# Patient Record
Sex: MALE | Race: OTHER | HISPANIC OR LATINO | URBAN - METROPOLITAN AREA
[De-identification: names, ages, dates, MRNs, and addresses within clinical notes are randomized per-mention and may not be internally consistent; named-entity substitution may affect disease eponyms.]

---

## 2017-07-06 ENCOUNTER — INPATIENT (INPATIENT)
Facility: HOSPITAL | Age: 57
LOS: 3 days | Discharge: HOME | DRG: 271 | End: 2017-07-10
Attending: INTERNAL MEDICINE | Admitting: INTERNAL MEDICINE
Payer: MEDICAID

## 2017-07-06 VITALS — OXYGEN SATURATION: 97 % | HEART RATE: 88 BPM | TEMPERATURE: 98 F | WEIGHT: 152.56 LBS | HEIGHT: 66 IN

## 2017-07-06 DIAGNOSIS — Z98.890 OTHER SPECIFIED POSTPROCEDURAL STATES: Chronic | ICD-10-CM

## 2017-07-06 DIAGNOSIS — I21.02 ST ELEVATION (STEMI) MYOCARDIAL INFARCTION INVOLVING LEFT ANTERIOR DESCENDING CORONARY ARTERY: ICD-10-CM

## 2017-07-06 DIAGNOSIS — E78.5 HYPERLIPIDEMIA, UNSPECIFIED: ICD-10-CM

## 2017-07-06 DIAGNOSIS — I21.3 ST ELEVATION (STEMI) MYOCARDIAL INFARCTION OF UNSPECIFIED SITE: ICD-10-CM

## 2017-07-06 DIAGNOSIS — I95.9 HYPOTENSION, UNSPECIFIED: ICD-10-CM

## 2017-07-06 DIAGNOSIS — I10 ESSENTIAL (PRIMARY) HYPERTENSION: ICD-10-CM

## 2017-07-06 DIAGNOSIS — I44.2 ATRIOVENTRICULAR BLOCK, COMPLETE: ICD-10-CM

## 2017-07-06 LAB
ALBUMIN SERPL ELPH-MCNC: 3.7 G/DL — SIGNIFICANT CHANGE UP (ref 3.3–5)
ALBUMIN SERPL ELPH-MCNC: 3.9 G/DL — SIGNIFICANT CHANGE UP (ref 3.3–5)
ALP SERPL-CCNC: 58 U/L — SIGNIFICANT CHANGE UP (ref 40–120)
ALP SERPL-CCNC: 60 U/L — SIGNIFICANT CHANGE UP (ref 40–120)
ALT FLD-CCNC: 23 U/L RC — SIGNIFICANT CHANGE UP (ref 10–45)
ALT FLD-CCNC: 38 U/L RC — SIGNIFICANT CHANGE UP (ref 10–45)
ANION GAP SERPL CALC-SCNC: 16 MMOL/L — SIGNIFICANT CHANGE UP (ref 5–17)
ANION GAP SERPL CALC-SCNC: 19 MMOL/L — HIGH (ref 5–17)
AST SERPL-CCNC: 151 U/L — HIGH (ref 10–40)
AST SERPL-CCNC: 29 U/L — SIGNIFICANT CHANGE UP (ref 10–40)
BASOPHILS # BLD AUTO: 0 K/UL — SIGNIFICANT CHANGE UP (ref 0–0.2)
BASOPHILS NFR BLD AUTO: 0.2 % — SIGNIFICANT CHANGE UP (ref 0–2)
BILIRUB SERPL-MCNC: 0.4 MG/DL — SIGNIFICANT CHANGE UP (ref 0.2–1.2)
BILIRUB SERPL-MCNC: 0.5 MG/DL — SIGNIFICANT CHANGE UP (ref 0.2–1.2)
BLD GP AB SCN SERPL QL: NEGATIVE — SIGNIFICANT CHANGE UP
BUN SERPL-MCNC: 16 MG/DL — SIGNIFICANT CHANGE UP (ref 7–23)
BUN SERPL-MCNC: 17 MG/DL — SIGNIFICANT CHANGE UP (ref 7–23)
CALCIUM SERPL-MCNC: 9.2 MG/DL — SIGNIFICANT CHANGE UP (ref 8.4–10.5)
CALCIUM SERPL-MCNC: 9.7 MG/DL — SIGNIFICANT CHANGE UP (ref 8.4–10.5)
CHLORIDE SERPL-SCNC: 100 MMOL/L — SIGNIFICANT CHANGE UP (ref 96–108)
CHLORIDE SERPL-SCNC: 103 MMOL/L — SIGNIFICANT CHANGE UP (ref 96–108)
CK MB CFR SERPL CALC: 300 NG/ML — HIGH (ref 0–6.7)
CK SERPL-CCNC: 2380 U/L — HIGH (ref 30–200)
CO2 SERPL-SCNC: 20 MMOL/L — LOW (ref 22–31)
CO2 SERPL-SCNC: 21 MMOL/L — LOW (ref 22–31)
CREAT SERPL-MCNC: 0.86 MG/DL — SIGNIFICANT CHANGE UP (ref 0.5–1.3)
CREAT SERPL-MCNC: 0.96 MG/DL — SIGNIFICANT CHANGE UP (ref 0.5–1.3)
EOSINOPHIL # BLD AUTO: 0 K/UL — SIGNIFICANT CHANGE UP (ref 0–0.5)
EOSINOPHIL NFR BLD AUTO: 0.1 % — SIGNIFICANT CHANGE UP (ref 0–6)
GLUCOSE SERPL-MCNC: 130 MG/DL — HIGH (ref 70–99)
GLUCOSE SERPL-MCNC: 159 MG/DL — HIGH (ref 70–99)
HCT VFR BLD CALC: 42.3 % — SIGNIFICANT CHANGE UP (ref 39–50)
HCT VFR BLD CALC: 42.7 % — SIGNIFICANT CHANGE UP (ref 39–50)
HGB BLD-MCNC: 14.4 G/DL — SIGNIFICANT CHANGE UP (ref 13–17)
HGB BLD-MCNC: 14.6 G/DL — SIGNIFICANT CHANGE UP (ref 13–17)
INR BLD: 1.05 RATIO — SIGNIFICANT CHANGE UP (ref 0.88–1.16)
LYMPHOCYTES # BLD AUTO: 0.7 K/UL — LOW (ref 1–3.3)
LYMPHOCYTES # BLD AUTO: 4.6 % — LOW (ref 13–44)
MAGNESIUM SERPL-MCNC: 2.2 MG/DL — SIGNIFICANT CHANGE UP (ref 1.6–2.6)
MCHC RBC-ENTMCNC: 30.3 PG — SIGNIFICANT CHANGE UP (ref 27–34)
MCHC RBC-ENTMCNC: 31.1 PG — SIGNIFICANT CHANGE UP (ref 27–34)
MCHC RBC-ENTMCNC: 33.8 GM/DL — SIGNIFICANT CHANGE UP (ref 32–36)
MCHC RBC-ENTMCNC: 34.6 GM/DL — SIGNIFICANT CHANGE UP (ref 32–36)
MCV RBC AUTO: 89.7 FL — SIGNIFICANT CHANGE UP (ref 80–100)
MCV RBC AUTO: 89.9 FL — SIGNIFICANT CHANGE UP (ref 80–100)
MONOCYTES # BLD AUTO: 0.6 K/UL — SIGNIFICANT CHANGE UP (ref 0–0.9)
MONOCYTES NFR BLD AUTO: 3.7 % — SIGNIFICANT CHANGE UP (ref 2–14)
NEUTROPHILS # BLD AUTO: 14 K/UL — HIGH (ref 1.8–7.4)
NEUTROPHILS NFR BLD AUTO: 91.4 % — HIGH (ref 43–77)
PHOSPHATE SERPL-MCNC: 4.2 MG/DL — SIGNIFICANT CHANGE UP (ref 2.5–4.5)
PLATELET # BLD AUTO: 242 K/UL — SIGNIFICANT CHANGE UP (ref 150–400)
PLATELET # BLD AUTO: 267 K/UL — SIGNIFICANT CHANGE UP (ref 150–400)
POTASSIUM SERPL-MCNC: 3.9 MMOL/L — SIGNIFICANT CHANGE UP (ref 3.5–5.3)
POTASSIUM SERPL-MCNC: 4.1 MMOL/L — SIGNIFICANT CHANGE UP (ref 3.5–5.3)
POTASSIUM SERPL-SCNC: 3.9 MMOL/L — SIGNIFICANT CHANGE UP (ref 3.5–5.3)
POTASSIUM SERPL-SCNC: 4.1 MMOL/L — SIGNIFICANT CHANGE UP (ref 3.5–5.3)
PROT SERPL-MCNC: 6.8 G/DL — SIGNIFICANT CHANGE UP (ref 6–8.3)
PROT SERPL-MCNC: 7.4 G/DL — SIGNIFICANT CHANGE UP (ref 6–8.3)
PROTHROM AB SERPL-ACNC: 11.3 SEC — SIGNIFICANT CHANGE UP (ref 9.8–12.7)
RBC # BLD: 4.7 M/UL — SIGNIFICANT CHANGE UP (ref 4.2–5.8)
RBC # BLD: 4.76 M/UL — SIGNIFICANT CHANGE UP (ref 4.2–5.8)
RBC # FLD: 11.3 % — SIGNIFICANT CHANGE UP (ref 10.3–14.5)
RBC # FLD: 11.4 % — SIGNIFICANT CHANGE UP (ref 10.3–14.5)
RH IG SCN BLD-IMP: POSITIVE — SIGNIFICANT CHANGE UP
SODIUM SERPL-SCNC: 139 MMOL/L — SIGNIFICANT CHANGE UP (ref 135–145)
SODIUM SERPL-SCNC: 140 MMOL/L — SIGNIFICANT CHANGE UP (ref 135–145)
TROPONIN T SERPL-MCNC: 4.51 NG/ML — HIGH (ref 0–0.06)
WBC # BLD: 15.3 K/UL — HIGH (ref 3.8–10.5)
WBC # BLD: 17.9 K/UL — HIGH (ref 3.8–10.5)
WBC # FLD AUTO: 15.3 K/UL — HIGH (ref 3.8–10.5)
WBC # FLD AUTO: 17.9 K/UL — HIGH (ref 3.8–10.5)

## 2017-07-06 PROCEDURE — 71010: CPT | Mod: 26

## 2017-07-06 PROCEDURE — 93010 ELECTROCARDIOGRAM REPORT: CPT

## 2017-07-06 PROCEDURE — 92941 PRQ TRLML REVSC TOT OCCL AMI: CPT | Mod: LD,GC

## 2017-07-06 PROCEDURE — 93458 L HRT ARTERY/VENTRICLE ANGIO: CPT | Mod: 26,59,GC

## 2017-07-06 PROCEDURE — 33967 INSERT I-AORT PERCUT DEVICE: CPT

## 2017-07-06 RX ORDER — ATORVASTATIN CALCIUM 80 MG/1
80 TABLET, FILM COATED ORAL AT BEDTIME
Qty: 0 | Refills: 0 | Status: DISCONTINUED | OUTPATIENT
Start: 2017-07-06 | End: 2017-07-10

## 2017-07-06 RX ORDER — HEPARIN SODIUM 5000 [USP'U]/ML
INJECTION INTRAVENOUS; SUBCUTANEOUS
Qty: 25000 | Refills: 0 | Status: DISCONTINUED | OUTPATIENT
Start: 2017-07-06 | End: 2017-07-08

## 2017-07-06 RX ORDER — CLOPIDOGREL BISULFATE 75 MG/1
75 TABLET, FILM COATED ORAL DAILY
Qty: 0 | Refills: 0 | Status: DISCONTINUED | OUTPATIENT
Start: 2017-07-07 | End: 2017-07-10

## 2017-07-06 RX ORDER — ASPIRIN/CALCIUM CARB/MAGNESIUM 324 MG
81 TABLET ORAL DAILY
Qty: 0 | Refills: 0 | Status: DISCONTINUED | OUTPATIENT
Start: 2017-07-07 | End: 2017-07-10

## 2017-07-06 RX ORDER — HEPARIN SODIUM 5000 [USP'U]/ML
4000 INJECTION INTRAVENOUS; SUBCUTANEOUS EVERY 6 HOURS
Qty: 0 | Refills: 0 | Status: DISCONTINUED | OUTPATIENT
Start: 2017-07-06 | End: 2017-07-08

## 2017-07-06 NOTE — H&P ADULT - PROBLEM SELECTOR PLAN 5
Not currently taking home medications and patient currently is hypotensive, augmenting to 90s with IABP.

## 2017-07-06 NOTE — H&P ADULT - ATTENDING COMMENTS
Patient presented with anterior wall STEMI, now status post PCI with PEARL to LAD with placement of IABP and subsequent TVP for complete heart block seen in cath lab. Now normal sinus rhythm.   - educated on the importance of DAPT   - Ace-inhibitor to initiated  - Beta-blocker initiated. Will monitor prior to discontinuing TVP  - patient is on Lipitor 80 mg daily  - TTE prior to discharge

## 2017-07-06 NOTE — H&P ADULT - PROBLEM SELECTOR PLAN 3
Resolved, however patient continues to have beats of AIVR. TVP in proper position on CXR.   - Continue to monitor on telemetry  - D/c TVP when appropriate

## 2017-07-06 NOTE — H&P ADULT - HISTORY OF PRESENT ILLNESS
57 yo M   p/w STEMI    Patient presented to [facility]. He was noted to have ST segment elevations in I/aVL, V2-V5, with reciprocal depressions in II/III/aVF. 55 yo Serbian speaking M pmhx -- who presents with STEMI.         Patient presented to [facility]. He was noted to have ST segment elevations in I/aVL, V2-V5, with reciprocal depressions in II/III/aVF. 57 yo Citizen of [place] speaking M pmhx HTN, HLD who presents with STEMI.     Patient was taking train home from work when he noticed chest pain with numbness down left arm to his hand. He never had this pain before, and described the pain as "everything." It was associated with sweating, nausea and vomiting. He described the sensation of feeling intoxicated, but denied any recent drinking. This pain started at 6 pm.         Patient presented to [facility]. He was noted to have ST segment elevations in I/aVL, V2-V5, with reciprocal depressions in II/III/aVF.     Received 325 mg ASA and 600 plavix, bolused heparin/gtt. Nitroglycerin and metoprolol    Went to cath lab  - POBA/PEARL x1 prox LAD (100%)  - IABP  - TVP  mid Cx 50%  RCA - no flow limiting stenosis      LVEDP 31 55 yo Bulgarian speaking M pmhx HTN, HLD who presents with anterolateral STEMI.     Patient was taking train home from work when he noticed chest pain with numbness down left arm to his hand. He never had this pain before, and described the pain as "everything." It was associated with sweating, nausea and vomiting. He described the sensation of feeling intoxicated, but denied any recent drinking. This pain started at 6 pm and gradually became worse. 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 He also felt like passing out, but denied loss of consciousness.     He initially went to [facility], where he was noted to have ST segment elevations in I/aVL, V2-V5, with reciprocal depressions in II/III/aVF. He was loaded with 325 mg ASA and 600 plavix, bolused heparin and started on heparin gtt. He was also given nitroglycerin and metoprolol.     He emergently was transferred to [facility] for cath. He had a POBA/PEARL x1 placed to proximal LAD (100% stenosed). His mid Cx was 50% stenosed and he had no flow limiting lesions in his RCA. His LVEDP was 31. He was given neosynephrine and had an IABP placed. Due to concerns of transient complete heart block had a TVP placed (in same access site of IABP).     While at the CCU his chest pain, nausea/vomiting, and sweating have resolved. He denied any pain in his left leg. He continues to have numbness in his left hand. His augmented BPs were in the 90s, with occasional runs of AIVR. Of note over the last few months he stopped taking his medications for his blood pressure. He does not remember the names of the medications he was previously on. 55 yo Cambodian speaking M pmhx HTN, HLD who presents with anterolateral STEMI. Patient was taking train home from work when he noticed chest pain with numbness down left arm to his hand. He never had this pain before, and described the pain as "everything." It was associated with sweating, nausea and vomiting. He described the sensation of feeling intoxicated, but denied any recent drinking. This pain started at 6 pm and gradually became worse. 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 He also felt like passing out, but denied loss of consciousness.     He initially went to [facility], where he was noted to have ST segment elevations in I/aVL, V2-V5, with reciprocal depressions in II/III/aVF. He was loaded with 325 mg ASA and 600 plavix, bolused heparin and started on heparin gtt. He was also given nitroglycerin and metoprolol. He emergently was transferred to [facility] for cath. He had a POBA/PEARL x1 placed to proximal LAD (100% stenosed). His mid Cx was 50% stenosed and he had no flow limiting lesions in his RCA. His LVEDP was 31. He was given neosynephrine and had an IABP placed. Due to concerns of transient complete heart block had a TVP placed (in same access site of IABP).     While at the CCU his chest pain, nausea/vomiting, and sweating have resolved. He denied any pain in his left leg. He continues to have numbness in his left hand. His augmented BPs were in the 90s, with occasional runs of AIVR. Of note over the last few months he stopped taking his medications for his blood pressure. He does not remember the names of the medications he was previously on.

## 2017-07-06 NOTE — H&P ADULT - NSHPSOCIALHISTORY_GEN_ALL_CORE
Does not smoke, drink or use drugs. Came originally from [place] to work. His family is currently in [place].

## 2017-07-06 NOTE — H&P ADULT - NSHPLABSRESULTS_GEN_ALL_CORE
CBC Full  -  ( 06 Jul 2017 22:06 )  WBC Count : 17.9 K/uL  Hemoglobin : 14.6 g/dL  Hematocrit : 42.3 %  Platelet Count - Automated : 267 K/uL  Mean Cell Volume : 89.9 fl  Mean Cell Hemoglobin : 31.1 pg  Mean Cell Hemoglobin Concentration : 34.6 gm/dL      07-06    139  |  100  |  16  ----------------------------<  159<H>  3.9   |  20<L>  |  0.86    Ca    9.7      06 Jul 2017 22:06    TPro  7.4  /  Alb  3.9  /  TBili  0.4  /  DBili  x   /  AST  29  /  ALT  23  /  AlkPhos  60  07-06    PT/INR - ( 06 Jul 2017 22:06 )   PT: 11.3 sec;   INR: 1.05 ratio        Overall: Elevated WBC ct, K 3.9, Cr 0.86, LFTs within normal

## 2017-07-06 NOTE — H&P ADULT - PROBLEM SELECTOR PLAN 1
S/p prox LAD PEARL and POBA, ASA, and plavix load  - Continue ASA in AM  - Readdress plavix vs brilinta load tomorrow AM  - Continue telemetry monitoring  - Continue IABP/TVP  - Pending TTE S/p prox LAD PEARL and POBA, ASA, and plavix load  - Continue ASA in AM  - Readdress plavix vs brilinta load tomorrow AM  - Continue telemetry monitoring  - Continue IABP/TVP  - Pending TTE in 72 hrs  - Pending TSH, Lipid panel and HgA1c results

## 2017-07-06 NOTE — H&P ADULT - ASSESSMENT
57 yo French speaking M pmhx HTN, HLD who presents with anterolateral STEMI c/b hypotension and CHB requiring IABP and TVP. 57 yo Sinhala speaking M pmhx HTN, HLD who presents with anterolateral STEMI c/b hypotension, CHB and AIVR requiring IABP and TVP.

## 2017-07-06 NOTE — H&P ADULT - PROBLEM SELECTOR PLAN 2
Possibly 2/2 reduced cardiac output in setting of MI vs hypovolemia. IABP in proper position on CXR.   - Continue IABP  - Pending TTE  - Continue to monitor CBC  - TVP/IABP site soft, no hematoma, with good DP pulse.

## 2017-07-06 NOTE — H&P ADULT - NSHPREVIEWOFSYSTEMS_GEN_ALL_CORE
General: No chills  Pulm: No dyspnea  CV: Please see HPI, no chest pain  GI: No nausea/vomiting, abdominal pain  Ext: No pain  Skin: No rashes  Neuro: Numbness in left hand

## 2017-07-06 NOTE — CHART NOTE - NSCHARTNOTEFT_GEN_A_CORE
====================  CCU MIDNIGHT ROUNDS  ====================    ROSARIO CORDOVA  [id number]    ====================  SUMMARY:  ====================    55 yo M HTN, HLD, transferred from [place] w/ AWSTEMI s/p POBA/PEARL x 1 100% pLAD, in labs w/ hypotension, intermittent CHB and AIVR s/p IABP and TVP placement via R groin    ====================  NEW EVENTS:  ====================    No CP/palpitations/SOB. No bruising, bleeding or hematoma noted R groin.    ====================  VITALS (Last 12 hrs):  ====================    T(C): 36.7 (07-06-17 @ 22:10), Max: 36.7 (07-06-17 @ 22:10)  HR: 84 (07-06-17 @ 23:00) (84 - 94)  BP: 90/48 (07-06-17 @ 22:15) (90/48 - 90/48)  BP(mean): 55 (07-06-17 @ 22:15) (55 - 55)  RR: 12 (07-06-17 @ 23:00) (12 - 16)  SpO2: 95% (07-06-17 @ 23:00) (95% - 97%)    TELEMETRY: sinus, w/ beats AIVR, NSVT     ====================  PLAN:  ====================  1. AWSTEMI - s/p POBA/PEARL x 1 100% LAD, start high intensity statin, ASA/plavix loaded, consider brilinta instead of plavix. Heparin gtt for IABP, c/w mech support, neurovascularly intact distally, R groin w/out bleeding, bruising or hematoma    2. Transient CHB - maintain TVP for now, check lytes, K >4, Mg >2     Hannah Lebron CCU NP   #[id number] ====================  CCU MIDNIGHT ROUNDS  ====================    ROSARIO CORDOVA  [id number]    ====================  SUMMARY:  ====================    55 yo M HTN, HLD, transferred from [place] w/ AWSTEMI s/p POBA/PEARL x 1 100% pLAD, in labs w/ hypotension, intermittent CHB and AIVR s/p IABP and TVP placement via R groin    ====================  NEW EVENTS:  ====================    No CP/palpitations/SOB. No bruising, bleeding or hematoma noted R groin.    ====================  VITALS (Last 12 hrs):  ====================    T(C): 36.7 (07-06-17 @ 22:10), Max: 36.7 (07-06-17 @ 22:10)  HR: 84 (07-06-17 @ 23:00) (84 - 94)  BP: 90/48 (07-06-17 @ 22:15) (90/48 - 90/48)  BP(mean): 55 (07-06-17 @ 22:15) (55 - 55)  RR: 12 (07-06-17 @ 23:00) (12 - 16)  SpO2: 95% (07-06-17 @ 23:00) (95% - 97%)    TELEMETRY: sinus, w/ beats AIVR, NSVT     ====================  PLAN:  ====================  1. AWSTEMI - s/p POBA/PEARL x 1 100% LAD, start high intensity statin, ASA/plavix loaded, consider brilinta instead of plavix. TTE @ 72h. Heparin gtt for IABP, c/w mech support, CXR in AM,  neurovascularly intact distally, R groin w/out bleeding, bruising or hematoma    2. Transient CHB - maintain TVP for now, check lytes, K >4, Mg >2     Hannah Lebron CCU NP   #[id number]

## 2017-07-06 NOTE — H&P ADULT - NSHPPHYSICALEXAM_GEN_ALL_CORE
General: No acute distress, resting comfortably  HEENT: Normocephalic, atraumatic  Pulm: CTAB no w/r/r  CV: No m/r/g, RRR, distant heart sounds  GI: Soft, NT, ND, no guarding or rigidity  Ext: R Inguinal site without hematoma, soft, with IABP/TVP wires in place  Vasc: 2+ DP pulses bilaterally

## 2017-07-07 LAB
ALBUMIN SERPL ELPH-MCNC: 3.8 G/DL — SIGNIFICANT CHANGE UP (ref 3.3–5)
ALP SERPL-CCNC: 55 U/L — SIGNIFICANT CHANGE UP (ref 40–120)
ALT FLD-CCNC: 88 U/L RC — HIGH (ref 10–45)
ANION GAP SERPL CALC-SCNC: 11 MMOL/L — SIGNIFICANT CHANGE UP (ref 5–17)
APTT BLD: 46.8 SEC — HIGH (ref 27.5–37.4)
APTT BLD: 56.8 SEC — HIGH (ref 27.5–37.4)
APTT BLD: 58.2 SEC — HIGH (ref 27.5–37.4)
APTT BLD: > 200 SEC (ref 27.5–37.4)
AST SERPL-CCNC: 502 U/L — HIGH (ref 10–40)
BASOPHILS # BLD AUTO: 0 K/UL — SIGNIFICANT CHANGE UP (ref 0–0.2)
BASOPHILS NFR BLD AUTO: 0.2 % — SIGNIFICANT CHANGE UP (ref 0–2)
BILIRUB SERPL-MCNC: 0.9 MG/DL — SIGNIFICANT CHANGE UP (ref 0.2–1.2)
BUN SERPL-MCNC: 17 MG/DL — SIGNIFICANT CHANGE UP (ref 7–23)
CALCIUM SERPL-MCNC: 9 MG/DL — SIGNIFICANT CHANGE UP (ref 8.4–10.5)
CHLORIDE SERPL-SCNC: 103 MMOL/L — SIGNIFICANT CHANGE UP (ref 96–108)
CHOLEST SERPL-MCNC: 213 MG/DL — HIGH (ref 10–199)
CK MB BLD-MCNC: 13.2 % — HIGH (ref 0–3.5)
CK MB BLD-MCNC: 15.3 % — HIGH (ref 0–3.5)
CK MB BLD-MCNC: 16.9 % — SIGNIFICANT CHANGE UP (ref 0–3.5)
CK MB BLD-MCNC: 17 % — HIGH (ref 0–3.5)
CK MB CFR SERPL CALC: 1000 NG/ML — SIGNIFICANT CHANGE UP (ref 0–6.7)
CK MB CFR SERPL CALC: 442.9 NG/ML — HIGH (ref 0–6.7)
CK MB CFR SERPL CALC: 728 NG/ML — HIGH (ref 0–6.7)
CK SERPL-CCNC: 3351 U/L — HIGH (ref 30–200)
CK SERPL-CCNC: 4758 U/L — HIGH (ref 30–200)
CK SERPL-CCNC: 5925 U/L — HIGH (ref 30–200)
CO2 SERPL-SCNC: 26 MMOL/L — SIGNIFICANT CHANGE UP (ref 22–31)
CREAT SERPL-MCNC: 0.99 MG/DL — SIGNIFICANT CHANGE UP (ref 0.5–1.3)
EOSINOPHIL # BLD AUTO: 0 K/UL — SIGNIFICANT CHANGE UP (ref 0–0.5)
EOSINOPHIL NFR BLD AUTO: 0.3 % — SIGNIFICANT CHANGE UP (ref 0–6)
GLUCOSE SERPL-MCNC: 124 MG/DL — HIGH (ref 70–99)
HBA1C BLD-MCNC: 5.3 % — SIGNIFICANT CHANGE UP (ref 4–5.6)
HCT VFR BLD CALC: 41.3 % — SIGNIFICANT CHANGE UP (ref 39–50)
HDLC SERPL-MCNC: 38 MG/DL — LOW (ref 40–125)
HGB BLD-MCNC: 14.3 G/DL — SIGNIFICANT CHANGE UP (ref 13–17)
INR BLD: 0.99 RATIO — SIGNIFICANT CHANGE UP (ref 0.88–1.16)
INR BLD: 1.02 RATIO — SIGNIFICANT CHANGE UP (ref 0.88–1.16)
INR BLD: 1.05 RATIO — SIGNIFICANT CHANGE UP (ref 0.88–1.16)
LIPID PNL WITH DIRECT LDL SERPL: 128 MG/DL — SIGNIFICANT CHANGE UP
LYMPHOCYTES # BLD AUTO: 1.3 K/UL — SIGNIFICANT CHANGE UP (ref 1–3.3)
LYMPHOCYTES # BLD AUTO: 12.7 % — LOW (ref 13–44)
MAGNESIUM SERPL-MCNC: 2.4 MG/DL — SIGNIFICANT CHANGE UP (ref 1.6–2.6)
MCHC RBC-ENTMCNC: 31.6 PG — SIGNIFICANT CHANGE UP (ref 27–34)
MCHC RBC-ENTMCNC: 34.7 GM/DL — SIGNIFICANT CHANGE UP (ref 32–36)
MCV RBC AUTO: 91.1 FL — SIGNIFICANT CHANGE UP (ref 80–100)
MONOCYTES # BLD AUTO: 0.9 K/UL — SIGNIFICANT CHANGE UP (ref 0–0.9)
MONOCYTES NFR BLD AUTO: 8.5 % — SIGNIFICANT CHANGE UP (ref 2–14)
NEUTROPHILS # BLD AUTO: 8.3 K/UL — HIGH (ref 1.8–7.4)
NEUTROPHILS NFR BLD AUTO: 78.4 % — HIGH (ref 43–77)
PHOSPHATE SERPL-MCNC: 4.7 MG/DL — HIGH (ref 2.5–4.5)
PLATELET # BLD AUTO: 244 K/UL — SIGNIFICANT CHANGE UP (ref 150–400)
POTASSIUM SERPL-MCNC: 4.3 MMOL/L — SIGNIFICANT CHANGE UP (ref 3.5–5.3)
POTASSIUM SERPL-SCNC: 4.3 MMOL/L — SIGNIFICANT CHANGE UP (ref 3.5–5.3)
PROT SERPL-MCNC: 6.7 G/DL — SIGNIFICANT CHANGE UP (ref 6–8.3)
PROTHROM AB SERPL-ACNC: 10.7 SEC — SIGNIFICANT CHANGE UP (ref 9.8–12.7)
PROTHROM AB SERPL-ACNC: 11 SEC — SIGNIFICANT CHANGE UP (ref 9.8–12.7)
PROTHROM AB SERPL-ACNC: 11.3 SEC — SIGNIFICANT CHANGE UP (ref 9.8–12.7)
RBC # BLD: 4.53 M/UL — SIGNIFICANT CHANGE UP (ref 4.2–5.8)
RBC # FLD: 11.7 % — SIGNIFICANT CHANGE UP (ref 10.3–14.5)
SODIUM SERPL-SCNC: 140 MMOL/L — SIGNIFICANT CHANGE UP (ref 135–145)
TOTAL CHOLESTEROL/HDL RATIO MEASUREMENT: 5.6 RATIO — SIGNIFICANT CHANGE UP (ref 3.4–9.6)
TRIGL SERPL-MCNC: 237 MG/DL — HIGH (ref 10–149)
TROPONIN T SERPL-MCNC: 16.22 NG/ML — HIGH (ref 0–0.06)
TROPONIN T SERPL-MCNC: 7.83 NG/ML — HIGH (ref 0–0.06)
TROPONIN T SERPL-MCNC: 8.69 NG/ML — HIGH (ref 0–0.06)
TSH SERPL-MCNC: 4.74 UIU/ML — HIGH (ref 0.27–4.2)
WBC # BLD: 10.6 K/UL — HIGH (ref 3.8–10.5)
WBC # FLD AUTO: 10.6 K/UL — HIGH (ref 3.8–10.5)

## 2017-07-07 PROCEDURE — 99291 CRITICAL CARE FIRST HOUR: CPT

## 2017-07-07 PROCEDURE — 93306 TTE W/DOPPLER COMPLETE: CPT | Mod: 26

## 2017-07-07 PROCEDURE — 93010 ELECTROCARDIOGRAM REPORT: CPT

## 2017-07-07 PROCEDURE — 71010: CPT | Mod: 26

## 2017-07-07 RX ORDER — METOPROLOL TARTRATE 50 MG
12.5 TABLET ORAL
Qty: 0 | Refills: 0 | Status: DISCONTINUED | OUTPATIENT
Start: 2017-07-07 | End: 2017-07-10

## 2017-07-07 RX ORDER — CAPTOPRIL 12.5 MG/1
6.25 TABLET ORAL EVERY 8 HOURS
Qty: 0 | Refills: 0 | Status: DISCONTINUED | OUTPATIENT
Start: 2017-07-07 | End: 2017-07-09

## 2017-07-07 RX ORDER — METOPROLOL TARTRATE 50 MG
12.5 TABLET ORAL ONCE
Qty: 0 | Refills: 0 | Status: COMPLETED | OUTPATIENT
Start: 2017-07-07 | End: 2017-07-07

## 2017-07-07 RX ADMIN — HEPARIN SODIUM 950 UNIT(S)/HR: 5000 INJECTION INTRAVENOUS; SUBCUTANEOUS at 18:37

## 2017-07-07 RX ADMIN — ATORVASTATIN CALCIUM 80 MILLIGRAM(S): 80 TABLET, FILM COATED ORAL at 21:57

## 2017-07-07 RX ADMIN — HEPARIN SODIUM 800 UNIT(S)/HR: 5000 INJECTION INTRAVENOUS; SUBCUTANEOUS at 06:44

## 2017-07-07 RX ADMIN — CLOPIDOGREL BISULFATE 75 MILLIGRAM(S): 75 TABLET, FILM COATED ORAL at 11:07

## 2017-07-07 RX ADMIN — HEPARIN SODIUM 800 UNIT(S)/HR: 5000 INJECTION INTRAVENOUS; SUBCUTANEOUS at 01:45

## 2017-07-07 RX ADMIN — HEPARIN SODIUM 950 UNIT(S)/HR: 5000 INJECTION INTRAVENOUS; SUBCUTANEOUS at 12:33

## 2017-07-07 RX ADMIN — Medication 12.5 MILLIGRAM(S): at 21:57

## 2017-07-07 RX ADMIN — Medication 81 MILLIGRAM(S): at 11:07

## 2017-07-07 RX ADMIN — ATORVASTATIN CALCIUM 80 MILLIGRAM(S): 80 TABLET, FILM COATED ORAL at 00:09

## 2017-07-07 RX ADMIN — CAPTOPRIL 6.25 MILLIGRAM(S): 12.5 TABLET ORAL at 19:54

## 2017-07-07 RX ADMIN — Medication 12.5 MILLIGRAM(S): at 09:29

## 2017-07-07 NOTE — PROGRESS NOTE ADULT - ASSESSMENT
57 yo Estonian Speaking M HTN, HLD, transferred from [place] w/ anterolateral STEMI s/p POBA/PEARL x 1 100% pLAD, w/ hypotension, intermittent CHB and AIVR s/p IABP and TVP placement via R groin

## 2017-07-07 NOTE — PROGRESS NOTE ADULT - SUBJECTIVE AND OBJECTIVE BOX
Patient is a 56y old  Male who presents with a chief complaint of STEMI (06 Jul 2017 22:14)      SUBJECTIVE / OVERNIGHT EVENTS:  Continues to have mild chest pain. No shortness of breath. Overnight had multiple episodes of NSVT/AIVR.       MEDICATIONS  (STANDING):  atorvastatin 80 milliGRAM(s) Oral at bedtime  heparin  Infusion.  Unit(s)/Hr (8 mL/Hr) IV Continuous <Continuous>  aspirin  chewable 81 milliGRAM(s) Oral daily  clopidogrel Tablet 75 milliGRAM(s) Oral daily    MEDICATIONS  (PRN):  heparin  Injectable 4000 Unit(s) IV Push every 6 hours PRN For aPTT less than 40      Vital Signs Last 24 Hrs  T(C): 36.8 (07-07-17 @ 07:00), Max: 36.8 (07-07-17 @ 05:00)  HR: 66 (07-07-17 @ 07:00) (66 - 94)  BP: 102/72 (07-07-17 @ 05:00) (90/48 - 102/72)  RR: 14 (07-07-17 @ 07:00) (10 - 18)  SpO2: 98% (07-07-17 @ 07:00) (95% - 99%)  CAPILLARY BLOOD GLUCOSE        I&O's Summary    06 Jul 2017 07:01  -  07 Jul 2017 07:00  --------------------------------------------------------  IN: 322 mL / OUT: 500 mL / NET: -178 mL    07 Jul 2017 07:01  -  07 Jul 2017 07:36  --------------------------------------------------------  IN: 18 mL / OUT: 0 mL / NET: 18 mL        PHYSICAL EXAM:  GENERAL: NAD, well-developed  HEAD:  Atraumatic, Normocephalic  EYES: EOMI, PERRLA, conjunctiva and sclera clear  NECK: Supple, No JVD  CHEST/LUNG: Clear to auscultation bilaterally; No wheeze  HEART: Regular rate and rhythm; No murmurs, rubs, or gallops  ABDOMEN: Soft, Nontender, Nondistended; Bowel sounds present  EXTREMITIES:  2+ Peripheral Pulses, No clubbing, cyanosis, or edema  PSYCH: AAOx3  NEUROLOGY: non-focal  SKIN: No rashes or lesions    LABS:                        14.3   10.6  )-----------( 244      ( 07 Jul 2017 06:00 )             41.3     07-07    140  |  103  |  17  ----------------------------<  124<H>  4.3   |  26  |  0.99    Ca    9.0      07 Jul 2017 06:00  Phos  4.7     07-07  Mg     2.4     07-07    TPro  6.7  /  Alb  3.8  /  TBili  0.9  /  DBili  x   /  AST  502<H>  /  ALT  88<H>  /  AlkPhos  55  07-07    PT/INR - ( 07 Jul 2017 06:00 )   PT: 10.7 sec;   INR: 0.99 ratio         PTT - ( 07 Jul 2017 06:00 )  PTT:56.8 sec  CARDIAC MARKERS ( 07 Jul 2017 06:00 )  x     / 16.22 ng/mL / 5925 U/L / x     / x      CARDIAC MARKERS ( 06 Jul 2017 23:07 )  x     / 4.51 ng/mL / 2380 U/L / x     / 405.5 ng/mL          RADIOLOGY & ADDITIONAL TESTS:    Imaging Personally Reviewed:    Consultant(s) Notes Reviewed:      Care Discussed with Consultants/Other Providers: Patient is a 56y old  Male who presents with a chief complaint of STEMI (06 Jul 2017 22:14)      SUBJECTIVE / OVERNIGHT EVENTS:  Continues to have mild chest pain. No shortness of breath. Overnight had multiple episodes of NSVT/AIVR.       MEDICATIONS  (STANDING):  atorvastatin 80 milliGRAM(s) Oral at bedtime  heparin  Infusion.  Unit(s)/Hr (8 mL/Hr) IV Continuous <Continuous>  aspirin  chewable 81 milliGRAM(s) Oral daily  clopidogrel Tablet 75 milliGRAM(s) Oral daily    MEDICATIONS  (PRN):  heparin  Injectable 4000 Unit(s) IV Push every 6 hours PRN For aPTT less than 40      Vital Signs Last 24 Hrs  T(C): 36.8 (07-07-17 @ 07:00), Max: 36.8 (07-07-17 @ 05:00)  HR: 66 (07-07-17 @ 07:00) (66 - 94)  BP: 102/72 (07-07-17 @ 05:00) (90/48 - 102/72)  RR: 14 (07-07-17 @ 07:00) (10 - 18)  SpO2: 98% (07-07-17 @ 07:00) (95% - 99%)  CAPILLARY BLOOD GLUCOSE        I&O's Summary    06 Jul 2017 07:01  -  07 Jul 2017 07:00  --------------------------------------------------------  IN: 322 mL / OUT: 500 mL / NET: -178 mL    07 Jul 2017 07:01  -  07 Jul 2017 07:36  --------------------------------------------------------  IN: 18 mL / OUT: 0 mL / NET: 18 mL        PHYSICAL EXAM:  GENERAL: NAD, well-developed  HEAD:  Atraumatic, Normocephalic  NECK: Supple, No JVD  CHEST/LUNG: Clear to auscultation bilaterally; No wheeze, rales or rhonchi  HEART: Regular rate and rhythm; No murmurs, rubs, or gallops. Soft heart sounds.   ABDOMEN: Soft, Nontender, Nondistended; Bowel sounds present  EXTREMITIES:  2+ DP pulses, No edema  PSYCH: Appropriate  NEUROLOGY: non-focal  SKIN: No rashes or lesions    LABS:                        14.3   10.6  )-----------( 244      ( 07 Jul 2017 06:00 )             41.3     07-07    140  |  103  |  17  ----------------------------<  124<H>  4.3   |  26  |  0.99    Ca    9.0      07 Jul 2017 06:00  Phos  4.7     07-07  Mg     2.4     07-07    TPro  6.7  /  Alb  3.8  /  TBili  0.9  /  DBili  x   /  AST  502<H>  /  ALT  88<H>  /  AlkPhos  55  07-07    PT/INR - ( 07 Jul 2017 06:00 )   PT: 10.7 sec;   INR: 0.99 ratio         PTT - ( 07 Jul 2017 06:00 )  PTT:56.8 sec  CARDIAC MARKERS ( 07 Jul 2017 06:00 )  x     / 16.22 ng/mL / 5925 U/L / x     / x      CARDIAC MARKERS ( 06 Jul 2017 23:07 )  x     / 4.51 ng/mL / 2380 U/L / x     / 405.5 ng/mL          RADIOLOGY & ADDITIONAL TESTS:    Imaging Personally Reviewed:    Consultant(s) Notes Reviewed:      Care Discussed with Consultants/Other Providers:

## 2017-07-07 NOTE — PROGRESS NOTE ADULT - ATTENDING COMMENTS
Patient admitted with anterior wall STEMI now status post PCI to LAD, IABP, and TVP.  Continue DAPT, high dose statin.  Initiate and uptitrate beta-blocker as tolerated.  Discontinue TVP as it may be contributing to ectopy.  Wean from IABP.  Start ACEi prior to discharge.

## 2017-07-08 LAB
ALBUMIN SERPL ELPH-MCNC: 3.7 G/DL — SIGNIFICANT CHANGE UP (ref 3.3–5)
ALP SERPL-CCNC: 49 U/L — SIGNIFICANT CHANGE UP (ref 40–120)
ALT FLD-CCNC: 89 U/L RC — HIGH (ref 10–45)
ANION GAP SERPL CALC-SCNC: 12 MMOL/L — SIGNIFICANT CHANGE UP (ref 5–17)
APTT BLD: 56 SEC — HIGH (ref 27.5–37.4)
AST SERPL-CCNC: 322 U/L — HIGH (ref 10–40)
BASOPHILS # BLD AUTO: 0 K/UL — SIGNIFICANT CHANGE UP (ref 0–0.2)
BASOPHILS NFR BLD AUTO: 0.4 % — SIGNIFICANT CHANGE UP (ref 0–2)
BILIRUB SERPL-MCNC: 1.3 MG/DL — HIGH (ref 0.2–1.2)
BUN SERPL-MCNC: 16 MG/DL — SIGNIFICANT CHANGE UP (ref 7–23)
CALCIUM SERPL-MCNC: 8.6 MG/DL — SIGNIFICANT CHANGE UP (ref 8.4–10.5)
CHLORIDE SERPL-SCNC: 101 MMOL/L — SIGNIFICANT CHANGE UP (ref 96–108)
CK MB BLD-MCNC: 11.2 % — HIGH (ref 0–3.5)
CK MB CFR SERPL CALC: 219.6 NG/ML — HIGH (ref 0–6.7)
CK SERPL-CCNC: 1953 U/L — HIGH (ref 30–200)
CO2 SERPL-SCNC: 25 MMOL/L — SIGNIFICANT CHANGE UP (ref 22–31)
CREAT SERPL-MCNC: 0.89 MG/DL — SIGNIFICANT CHANGE UP (ref 0.5–1.3)
EOSINOPHIL # BLD AUTO: 0 K/UL — SIGNIFICANT CHANGE UP (ref 0–0.5)
EOSINOPHIL NFR BLD AUTO: 0.2 % — SIGNIFICANT CHANGE UP (ref 0–6)
GLUCOSE SERPL-MCNC: 117 MG/DL — HIGH (ref 70–99)
HCT VFR BLD CALC: 41.5 % — SIGNIFICANT CHANGE UP (ref 39–50)
HGB BLD-MCNC: 14.7 G/DL — SIGNIFICANT CHANGE UP (ref 13–17)
INR BLD: 1.1 RATIO — SIGNIFICANT CHANGE UP (ref 0.88–1.16)
LYMPHOCYTES # BLD AUTO: 1.9 K/UL — SIGNIFICANT CHANGE UP (ref 1–3.3)
LYMPHOCYTES # BLD AUTO: 15.5 % — SIGNIFICANT CHANGE UP (ref 13–44)
MAGNESIUM SERPL-MCNC: 2.2 MG/DL — SIGNIFICANT CHANGE UP (ref 1.6–2.6)
MCHC RBC-ENTMCNC: 32 PG — SIGNIFICANT CHANGE UP (ref 27–34)
MCHC RBC-ENTMCNC: 35.3 GM/DL — SIGNIFICANT CHANGE UP (ref 32–36)
MCV RBC AUTO: 90.6 FL — SIGNIFICANT CHANGE UP (ref 80–100)
MONOCYTES # BLD AUTO: 1 K/UL — HIGH (ref 0–0.9)
MONOCYTES NFR BLD AUTO: 8.6 % — SIGNIFICANT CHANGE UP (ref 2–14)
NEUTROPHILS # BLD AUTO: 9.1 K/UL — HIGH (ref 1.8–7.4)
NEUTROPHILS NFR BLD AUTO: 75.3 % — SIGNIFICANT CHANGE UP (ref 43–77)
PHOSPHATE SERPL-MCNC: 3.2 MG/DL — SIGNIFICANT CHANGE UP (ref 2.5–4.5)
PLATELET # BLD AUTO: 202 K/UL — SIGNIFICANT CHANGE UP (ref 150–400)
POTASSIUM SERPL-MCNC: 3.5 MMOL/L — SIGNIFICANT CHANGE UP (ref 3.5–5.3)
POTASSIUM SERPL-SCNC: 3.5 MMOL/L — SIGNIFICANT CHANGE UP (ref 3.5–5.3)
PROT SERPL-MCNC: 6.5 G/DL — SIGNIFICANT CHANGE UP (ref 6–8.3)
PROTHROM AB SERPL-ACNC: 11.9 SEC — SIGNIFICANT CHANGE UP (ref 9.8–12.7)
RBC # BLD: 4.59 M/UL — SIGNIFICANT CHANGE UP (ref 4.2–5.8)
RBC # FLD: 11.6 % — SIGNIFICANT CHANGE UP (ref 10.3–14.5)
SODIUM SERPL-SCNC: 138 MMOL/L — SIGNIFICANT CHANGE UP (ref 135–145)
TROPONIN T SERPL-MCNC: 10.01 NG/ML — HIGH (ref 0–0.06)
WBC # BLD: 12.1 K/UL — HIGH (ref 3.8–10.5)
WBC # FLD AUTO: 12.1 K/UL — HIGH (ref 3.8–10.5)

## 2017-07-08 PROCEDURE — 71010: CPT | Mod: 26

## 2017-07-08 RX ORDER — DOCUSATE SODIUM 100 MG
100 CAPSULE ORAL THREE TIMES A DAY
Qty: 0 | Refills: 0 | Status: DISCONTINUED | OUTPATIENT
Start: 2017-07-08 | End: 2017-07-10

## 2017-07-08 RX ORDER — SENNA PLUS 8.6 MG/1
2 TABLET ORAL AT BEDTIME
Qty: 0 | Refills: 0 | Status: DISCONTINUED | OUTPATIENT
Start: 2017-07-08 | End: 2017-07-10

## 2017-07-08 RX ORDER — POTASSIUM CHLORIDE 20 MEQ
40 PACKET (EA) ORAL EVERY 4 HOURS
Qty: 0 | Refills: 0 | Status: COMPLETED | OUTPATIENT
Start: 2017-07-08 | End: 2017-07-08

## 2017-07-08 RX ADMIN — ATORVASTATIN CALCIUM 80 MILLIGRAM(S): 80 TABLET, FILM COATED ORAL at 21:30

## 2017-07-08 RX ADMIN — Medication 81 MILLIGRAM(S): at 11:45

## 2017-07-08 RX ADMIN — HEPARIN SODIUM 950 UNIT(S)/HR: 5000 INJECTION INTRAVENOUS; SUBCUTANEOUS at 01:34

## 2017-07-08 RX ADMIN — CAPTOPRIL 6.25 MILLIGRAM(S): 12.5 TABLET ORAL at 10:32

## 2017-07-08 RX ADMIN — Medication 40 MILLIEQUIVALENT(S): at 10:32

## 2017-07-08 RX ADMIN — Medication 12.5 MILLIGRAM(S): at 07:28

## 2017-07-08 RX ADMIN — CLOPIDOGREL BISULFATE 75 MILLIGRAM(S): 75 TABLET, FILM COATED ORAL at 11:44

## 2017-07-08 RX ADMIN — CAPTOPRIL 6.25 MILLIGRAM(S): 12.5 TABLET ORAL at 17:40

## 2017-07-08 RX ADMIN — Medication 12.5 MILLIGRAM(S): at 18:35

## 2017-07-08 RX ADMIN — CAPTOPRIL 6.25 MILLIGRAM(S): 12.5 TABLET ORAL at 03:12

## 2017-07-08 RX ADMIN — Medication 40 MILLIEQUIVALENT(S): at 17:07

## 2017-07-08 NOTE — PROGRESS NOTE ADULT - SUBJECTIVE AND OBJECTIVE BOX
CHIEF COMPLAINT: AWSTEMI    INTERVAL HISTORY: No events overnight    REVIEW OF SYSTEMS: all others negative    MEDICATIONS  (STANDING):  atorvastatin 80 milliGRAM(s) Oral at bedtime  heparin  Infusion.  Unit(s)/Hr (8 mL/Hr) IV Continuous <Continuous>  aspirin  chewable 81 milliGRAM(s) Oral daily  clopidogrel Tablet 75 milliGRAM(s) Oral daily  metoprolol 12.5 milliGRAM(s) Oral two times a day  captopril 6.25 milliGRAM(s) Oral every 8 hours  potassium chloride    Tablet ER 40 milliEquivalent(s) Oral every 4 hours    MEDICATIONS  (PRN):  heparin  Injectable 4000 Unit(s) IV Push every 6 hours PRN For aPTT less than 40      Objective:  Vital Signs Last 24 Hrs  T(C): 36.8 (2017 19:00), Max: 36.8 (2017 19:00)  T(F): 98.3 (2017 19:00), Max: 98.3 (2017 19:00)  HR: 76 (:00) (66 - 78)    RR: 15 (2017 07:00) (10 - 18)  SpO2: 96% (2017 07:00) (92% - 99%)  ICU Vital Signs Last 24 Hrs  T(C): 36.8 (2017 19:00), Max: 36.8 (2017 19:00)  T(F): 98.3 (2017 19:00), Max: 98.3 (2017 19:00)  HR: 76 (:00) (66 - 78)    RR: 15 (2017 07:00) (10 - 18)  SpO2: 96% (2017 07:00) (92% - 99%)      Adult Advanced Hemodynamics Last 24 Hrs       @ :  -   @ 07:00  --------------------------------------------------------  IN: 1362 mL / OUT: 800 mL / NET: 562 mL     @ 07:  -   @ 08:00  --------------------------------------------------------  IN: 0 mL / OUT: 300 mL / NET: -300 mL      Daily     Daily     PHYSICAL EXAM:      Constitutional: No distress    Neuro: A+OX3    Respiratory: CTA Bilt    Cardiovascular: S1,S2 IABP    Gastrointestinal: +BS    Extremities: No edema, IABP r groin access    Vascular: Pulses palpable        TELEMETRY: SR with unifocal PVCs    EKG:     CARDIAC CATH:  pLAD    ECHO:    IMAGIN.7   12.1  )-----------( 202      ( 2017 01:16 )             41.5     -    138  |  101  |  16  ----------------------------<  117<H>  3.5   |  25  |  0.89    Ca    8.6      2017 01:16  Phos  3.2     -  Mg     2.2         TPro  6.5  /  Alb  3.7  /  TBili  1.3<H>  /  DBili  x   /  AST  322<H>  /  ALT  89<H>  /  AlkPhos  49  -08    LIVER FUNCTIONS - ( 2017 01:16 )  Alb: 3.7 g/dL / Pro: 6.5 g/dL / ALK PHOS: 49 U/L / ALT: 89 U/L RC / AST: 322 U/L / GGT: x           PT/INR - ( 2017 01:16 )   PT: 11.9 sec;   INR: 1.10 ratio         PTT - ( 2017 01:16 )  PTT:56.0 sec  Troponin T, Serum: 10.01 ng/mL ( @ 01:16)  Creatine Kinase, Serum: 1953 U/L ( 01:16)  CKMB Units: 219.6 ng/mL ( 01:16)  Creatine Kinase, Serum: 3351 U/L ( @ 18:01)  CKMB Units: 442.9 ng/mL ( @ 18:01)  Troponin T, Serum: 7.83 ng/mL ( @ 18:01)  Troponin T, Serum: 8.69 ng/mL ( @ 11:43)  CKMB Units: 728.0 ng/mL ( @ 11:43)  Creatine Kinase, Serum: 4758 U/L ( @ 11:43)      *BLOOD GAS/ARTERIAL/MIXED/VENOUS  *LACTATE      HEALTH ISSUES - PROBLEM Dx:  HTN (hypertension): HTN (hypertension)  HLD (hyperlipidemia): HLD (hyperlipidemia)  Complete heart block: Complete heart block  Hypotension: Hypotension  ST elevation myocardial infarction involving left anterior descending (LAD) coronary artery: ST elevation myocardial infarction involving left anterior descending (LAD) coronary artery        HEALTH ISSUES - R/O PROBLEM Dx:      JUANA Burgos NP   # CHIEF COMPLAINT: AWSTEMI    INTERVAL HISTORY: No events overnight    REVIEW OF SYSTEMS: all others negative    MEDICATIONS  (STANDING):  atorvastatin 80 milliGRAM(s) Oral at bedtime  heparin  Infusion.  Unit(s)/Hr (8 mL/Hr) IV Continuous <Continuous>  aspirin  chewable 81 milliGRAM(s) Oral daily  clopidogrel Tablet 75 milliGRAM(s) Oral daily  metoprolol 12.5 milliGRAM(s) Oral two times a day  captopril 6.25 milliGRAM(s) Oral every 8 hours  potassium chloride    Tablet ER 40 milliEquivalent(s) Oral every 4 hours    MEDICATIONS  (PRN):  heparin  Injectable 4000 Unit(s) IV Push every 6 hours PRN For aPTT less than 40      Objective:  Vital Signs Last 24 Hrs  T(C): 36.8 (2017 19:00), Max: 36.8 (2017 19:00)  T(F): 98.3 (2017 19:00), Max: 98.3 (2017 19:00)  HR: 76 (:00) (66 - 78)    RR: 15 (2017 07:00) (10 - 18)  SpO2: 96% (2017 07:00) (92% - 99%)  ICU Vital Signs Last 24 Hrs  T(C): 36.8 (2017 19:00), Max: 36.8 (2017 19:00)  T(F): 98.3 (2017 19:00), Max: 98.3 (2017 19:00)  HR: 76 (:00) (66 - 78)    RR: 15 (2017 07:00) (10 - 18)  SpO2: 96% (2017 07:00) (92% - 99%)      Adult Advanced Hemodynamics Last 24 Hrs       @ :  -   @ 07:00  --------------------------------------------------------  IN: 1362 mL / OUT: 800 mL / NET: 562 mL     @ 07:  -   @ 08:00  --------------------------------------------------------  IN: 0 mL / OUT: 300 mL / NET: -300 mL      Daily     Daily     PHYSICAL EXAM:      Constitutional: No distress    Neuro: A+OX3    Respiratory: CTA Bilt    Cardiovascular: S1,S2 IABP    Gastrointestinal: +BS    Extremities: No edema, IABP r groin access    Vascular: Pulses palpable        TELEMETRY: SR with unifocal PVCs    EKG:     CARDIAC CATH:  pLAD RCA, LM and Cx with non obstructive disease EF 35%    ECHO:  EF 40-45% Septal, mid to Distal inferior, distal anterior and apical cap hypokinetic    IMAGIN.7   12.1  )-----------( 202      ( 2017 01:16 )             41.5     07-08    138  |  101  |  16  ----------------------------<  117<H>  3.5   |  25  |  0.89    Ca    8.6      2017 01:16  Phos  3.2     07-08  Mg     2.2     07-08    TPro  6.5  /  Alb  3.7  /  TBili  1.3<H>  /  DBili  x   /  AST  322<H>  /  ALT  89<H>  /  AlkPhos  49  07-08    LIVER FUNCTIONS - ( 2017 01:16 )  Alb: 3.7 g/dL / Pro: 6.5 g/dL / ALK PHOS: 49 U/L / ALT: 89 U/L RC / AST: 322 U/L / GGT: x           PT/INR - ( 2017 01:16 )   PT: 11.9 sec;   INR: 1.10 ratio         PTT - ( 2017 01:16 )  PTT:56.0 sec  Troponin T, Serum: 10.01 ng/mL ( @ 01:16)  Creatine Kinase, Serum: 1953 U/L ( 01:16)  CKMB Units: 219.6 ng/mL ( @ 01:16)  Creatine Kinase, Serum: 3351 U/L ( @ 18:01)  CKMB Units: 442.9 ng/mL ( @ 18:01)  Troponin T, Serum: 7.83 ng/mL ( @ 18:01)  Troponin T, Serum: 8.69 ng/mL ( @ 11:43)  CKMB Units: 728.0 ng/mL ( @ 11:43)  Creatine Kinase, Serum: 4758 U/L ( @ 11:43)      *BLOOD GAS/ARTERIAL/MIXED/VENOUS  *LACTATE      HEALTH ISSUES - PROBLEM Dx:  HTN (hypertension): HTN (hypertension)  HLD (hyperlipidemia): HLD (hyperlipidemia)  Complete heart block: Complete heart block  Hypotension: Hypotension  ST elevation myocardial infarction involving left anterior descending (LAD) coronary artery: ST elevation myocardial infarction involving left anterior descending (LAD) coronary artery        HEALTH ISSUES - R/O PROBLEM Dx:      JUANA Burgos NP   # CHIEF COMPLAINT: AWSTEMI    INTERVAL HISTORY: No events overnight    REVIEW OF SYSTEMS: [place] Speaking, Denies Chest Pain, SOB. all others negative    MEDICATIONS  (STANDING):  atorvastatin 80 milliGRAM(s) Oral at bedtime  heparin  Infusion.  Unit(s)/Hr (8 mL/Hr) IV Continuous <Continuous>  aspirin  chewable 81 milliGRAM(s) Oral daily  clopidogrel Tablet 75 milliGRAM(s) Oral daily  metoprolol 12.5 milliGRAM(s) Oral two times a day  captopril 6.25 milliGRAM(s) Oral every 8 hours  potassium chloride    Tablet ER 40 milliEquivalent(s) Oral every 4 hours    MEDICATIONS  (PRN):  heparin  Injectable 4000 Unit(s) IV Push every 6 hours PRN For aPTT less than 40      Objective:  Vital Signs Last 24 Hrs  T(C): 36.8 (2017 19:00), Max: 36.8 (2017 19:00)  T(F): 98.3 (2017 19:00), Max: 98.3 (2017 19:00)  HR: 76 (2017 07:00) (66 - 78)    RR: 15 (2017 07:00) (10 - 18)  SpO2: 96% (2017 07:00) (92% - 99%)  ICU Vital Signs Last 24 Hrs  T(C): 36.8 (2017 19:00), Max: 36.8 (2017 19:00)  T(F): 98.3 (2017 19:00), Max: 98.3 (2017 19:00)  HR: 76 (:00) (66 - 78)    RR: 15 (2017 07:00) (10 - 18)  SpO2: 96% (2017 07:00) (92% - 99%)      Adult Advanced Hemodynamics Last 24 Hrs       @ :  -   @ 07:00  --------------------------------------------------------  IN: 1362 mL / OUT: 800 mL / NET: 562 mL     @ 07:  -  07-08 @ 08:00  --------------------------------------------------------  IN: 0 mL / OUT: 300 mL / NET: -300 mL      Daily     Daily     PHYSICAL EXAM:      Constitutional: No distress    Neuro: A+OX3    Respiratory: CTA Bilt    Cardiovascular: S1,S2 IABP    Gastrointestinal: +BS    Extremities: No edema, IABP r groin access    Vascular: Pulses palpable        TELEMETRY: SR with unifocal PVCs    EKG:     CARDIAC CATH:  pLAD RCA, LM and Cx with non obstructive disease EF 35%    ECHO:  EF 40-45% Septal, mid to Distal inferior, distal anterior and apical cap hypokinetic    IMAGIN.7   12.1  )-----------( 202      ( 2017 01:16 )             41.5     -    138  |  101  |  16  ----------------------------<  117<H>  3.5   |  25  |  0.89    Ca    8.6      2017 01:16  Phos  3.2     -  Mg     2.2         TPro  6.5  /  Alb  3.7  /  TBili  1.3<H>  /  DBili  x   /  AST  322<H>  /  ALT  89<H>  /  AlkPhos  49  07-08    LIVER FUNCTIONS - ( 2017 01:16 )  Alb: 3.7 g/dL / Pro: 6.5 g/dL / ALK PHOS: 49 U/L / ALT: 89 U/L RC / AST: 322 U/L / GGT: x           PT/INR - ( 2017 01:16 )   PT: 11.9 sec;   INR: 1.10 ratio         PTT - ( 2017 01:16 )  PTT:56.0 sec  Troponin T, Serum: 10.01 ng/mL ( @ 01:16)  Creatine Kinase, Serum: 1953 U/L ( @ 01:16)  CKMB Units: 219.6 ng/mL ( 01:16)  Creatine Kinase, Serum: 3351 U/L ( @ 18:01)  CKMB Units: 442.9 ng/mL ( @ 18:01)  Troponin T, Serum: 7.83 ng/mL ( @ 18:01)  Troponin T, Serum: 8.69 ng/mL ( @ 11:43)  CKMB Units: 728.0 ng/mL ( @ 11:43)  Creatine Kinase, Serum: 4758 U/L ( @ 11:43)      *BLOOD GAS/ARTERIAL/MIXED/VENOUS  *LACTATE      HEALTH ISSUES - PROBLEM Dx:  HTN (hypertension): HTN (hypertension)  HLD (hyperlipidemia): HLD (hyperlipidemia)  Complete heart block: Complete heart block  Hypotension: Hypotension  ST elevation myocardial infarction involving left anterior descending (LAD) coronary artery: ST elevation myocardial infarction involving left anterior descending (LAD) coronary artery        HEALTH ISSUES - R/O PROBLEM Dx:      JUANA Burgos NP   #

## 2017-07-08 NOTE — CHART NOTE - NSCHARTNOTEFT_GEN_A_CORE
====================  NEW EVENTS:  ====================      ====================  SUMMARY:  ====================  55 yo [place]isw[place]ili Speaking M HTN, HLD, transferred from [place] w/ anterolateral STEMI s/p POBA/PEARL x 1 100% pLAD, w/ hypotension, intermittent CHB and AIVR s/p IABP and TVP placement via R groin    ====================  VITALS:  ====================    ICU Vital Signs Last 24 Hrs  T(C): 36.8 (07 Jul 2017 19:00), Max: 36.8 (07 Jul 2017 05:00)  T(F): 98.3 (07 Jul 2017 19:00), Max: 98.3 (07 Jul 2017 19:00)  HR: 70 (08 Jul 2017 02:00) (66 - 82)  BP: 102/72 (07 Jul 2017 05:00) (102/72 - 102/72)  BP(mean): 92 (07 Jul 2017 05:00) (92 - 92)  ABP: --  ABP(mean): --  RR: 13 (08 Jul 2017 02:00) (10 - 18)  SpO2: 96% (08 Jul 2017 02:00) (95% - 99%)      I&O's Summary    06 Jul 2017 07:01  -  07 Jul 2017 07:00  --------------------------------------------------------  IN: 322 mL / OUT: 500 mL / NET: -178 mL    07 Jul 2017 07:01  -  08 Jul 2017 02:33  --------------------------------------------------------  IN: 1293 mL / OUT: 500 mL / NET: 793 mL        Adult Advanced Hemodynamics Last 24 Hrs  CVP(mm Hg): --  CVP(cm H2O): --  CO: --  CI: --  PA: --  PA(mean): --  PCWP: --  SVR: --  SVRI: --  PVR: --  PVRI: --        ====================  LABS:  ====================                          14.7   12.1  )-----------( 202      ( 08 Jul 2017 01:16 )             41.5     07-08    138  |  101  |  16  ----------------------------<  117<H>  3.5   |  25  |  0.89    Ca    8.6      08 Jul 2017 01:16  Phos  3.2     07-08  Mg     2.2     07-08    TPro  6.5  /  Alb  3.7  /  TBili  1.3<H>  /  DBili  x   /  AST  322<H>  /  ALT  89<H>  /  AlkPhos  49  07-08    PT/INR - ( 08 Jul 2017 01:16 )   PT: 11.9 sec;   INR: 1.10 ratio         PTT - ( 08 Jul 2017 01:16 )  PTT:56.0 sec  Troponin T, Serum: 10.01 ng/mL <H> (07-08-17 @ 01:16)  Creatine Kinase, Serum: 1953 U/L <H> (07-08-17 @ 01:16)  Creatine Kinase, Serum: 3351 U/L <H> (07-07-17 @ 18:01)  Troponin T, Serum: 7.83 ng/mL <H> (07-07-17 @ 18:01)  Troponin T, Serum: 8.69 ng/mL <H> (07-07-17 @ 11:43)  Creatine Kinase, Serum: 4758 U/L <H> (07-07-17 @ 11:43)  Troponin T, Serum: 16.22 ng/mL <H> (07-07-17 @ 06:00)  Creatine Kinase, Serum: 5925 U/L <H> (07-07-17 @ 06:00)        ====================  PLAN:  ====================  -Hold hep gtt @ 5 for IABP removal  -TTE 7/9  -TVP in R tomy pt currently native paced w/ NSR ====================  NEW EVENTS:  ====================      ====================  SUMMARY:  ====================  55 yo Tajik Speaking M HTN, HLD, transferred from [place] w/ anterolateral STEMI s/p POBA/PEARL x 1 100% pLAD, w/ hypotension, intermittent CHB and AIVR s/p IABP and TVP placement via R groin    ====================  VITALS:  ====================    ICU Vital Signs Last 24 Hrs  T(C): 36.8 (07 Jul 2017 19:00), Max: 36.8 (07 Jul 2017 05:00)  T(F): 98.3 (07 Jul 2017 19:00), Max: 98.3 (07 Jul 2017 19:00)  HR: 70 (08 Jul 2017 02:00) (66 - 82)  BP: 102/72 (07 Jul 2017 05:00) (102/72 - 102/72)  BP(mean): 92 (07 Jul 2017 05:00) (92 - 92)  ABP: --  ABP(mean): --  RR: 13 (08 Jul 2017 02:00) (10 - 18)  SpO2: 96% (08 Jul 2017 02:00) (95% - 99%)      I&O's Summary    06 Jul 2017 07:01  -  07 Jul 2017 07:00  --------------------------------------------------------  IN: 322 mL / OUT: 500 mL / NET: -178 mL    07 Jul 2017 07:01  -  08 Jul 2017 02:33  --------------------------------------------------------  IN: 1293 mL / OUT: 500 mL / NET: 793 mL        Adult Advanced Hemodynamics Last 24 Hrs  CVP(mm Hg): --  CVP(cm H2O): --  CO: --  CI: --  PA: --  PA(mean): --  PCWP: --  SVR: --  SVRI: --  PVR: --  PVRI: --        ====================  LABS:  ====================                          14.7   12.1  )-----------( 202      ( 08 Jul 2017 01:16 )             41.5     07-08    138  |  101  |  16  ----------------------------<  117<H>  3.5   |  25  |  0.89    Ca    8.6      08 Jul 2017 01:16  Phos  3.2     07-08  Mg     2.2     07-08    TPro  6.5  /  Alb  3.7  /  TBili  1.3<H>  /  DBili  x   /  AST  322<H>  /  ALT  89<H>  /  AlkPhos  49  07-08    PT/INR - ( 08 Jul 2017 01:16 )   PT: 11.9 sec;   INR: 1.10 ratio         PTT - ( 08 Jul 2017 01:16 )  PTT:56.0 sec  Troponin T, Serum: 10.01 ng/mL <H> (07-08-17 @ 01:16)  Creatine Kinase, Serum: 1953 U/L <H> (07-08-17 @ 01:16)  Creatine Kinase, Serum: 3351 U/L <H> (07-07-17 @ 18:01)  Troponin T, Serum: 7.83 ng/mL <H> (07-07-17 @ 18:01)  Troponin T, Serum: 8.69 ng/mL <H> (07-07-17 @ 11:43)  Creatine Kinase, Serum: 4758 U/L <H> (07-07-17 @ 11:43)  Troponin T, Serum: 16.22 ng/mL <H> (07-07-17 @ 06:00)  Creatine Kinase, Serum: 5925 U/L <H> (07-07-17 @ 06:00)        ====================  PLAN:  ====================  -Hold hep gtt @ 5 for IABP removal  -CE downtrending. Cont ASA/Plavix Lipitor, Captopril, BB  -TTE 7/9  -TVP in R groin, pt currently native paced w/ NSR

## 2017-07-08 NOTE — CHART NOTE - NSCHARTNOTEFT_GEN_A_CORE
CCU MIDNIGHT ROUNDS    ROSARIO CORDOVA  [id number]  56y  Male    SUMMARY:    HPI:  57 yo [place] citizen speaking M pmhx HTN, HLD who presents with anterolateral STEMI. Patient was taking train home from work when he noticed chest pain with numbness down left arm to his hand. He never had this pain before, and described the pain as "everything." It was associated with sweating, nausea and vomiting. He described the sensation of feeling intoxicated, but denied any recent drinking. This pain started at 6 pm and gradually became worse. 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 He also felt like passing out, but denied loss of consciousness.     He initially went to [facility], where he was noted to have ST segment elevations in I/aVL, V2-V5, with reciprocal depressions in II/III/aVF. He was loaded with 325 mg ASA and 600 plavix, bolused heparin and started on heparin gtt. He was also given nitroglycerin and metoprolol. He emergently was transferred to [facility] for cath. He had a POBA/PEARL x1 placed to proximal LAD (100% stenosed). His mid Cx was 50% stenosed and he had no flow limiting lesions in his RCA. His LVEDP was 31. He was given neosynephrine and had an IABP placed. Due to concerns of transient complete heart block had a TVP placed (in same access site of IABP).     While at the CCU his chest pain, nausea/vomiting, and sweating have resolved. He denied any pain in his left leg. He continues to have numbness in his left hand. His augmented BPs were in the 90s, with occasional runs of AIVR. Of note over the last few months he stopped taking his medications for his blood pressure. He does not remember the names of the medications he was previously on. (06 Jul 2017 21:20)      NEW EVENTS: IABP and TVP removed today.      UPDATED VITALS:    ICU Vital Signs Last 24 Hrs  T(C): 36.9 (08 Jul 2017 16:00), Max: 36.9 (08 Jul 2017 14:45)  T(F): 98.4 (08 Jul 2017 16:00), Max: 98.4 (08 Jul 2017 14:45)  HR: 76 (08 Jul 2017 23:00) (68 - 92)  BP: 102/71 (08 Jul 2017 23:00) (102/71 - 130/90)  BP(mean): 78 (08 Jul 2017 23:00) (78 - 108)  ABP: --  ABP(mean): --  RR: 14 (08 Jul 2017 23:00) (10 - 37)  SpO2: 98% (08 Jul 2017 23:00) (92% - 99%)      I&O's Summary    07 Jul 2017 07:01  -  08 Jul 2017 07:00  --------------------------------------------------------  IN: 1362 mL / OUT: 800 mL / NET: 562 mL    08 Jul 2017 07:01  -  08 Jul 2017 23:37  --------------------------------------------------------  IN: 510 mL / OUT: 1076 mL / NET: -566 mL            NEW LABS:                          14.7   12.1  )-----------( 202      ( 08 Jul 2017 01:16 )             41.5     07-08    138  |  101  |  16  ----------------------------<  117<H>  3.5   |  25  |  0.89    Ca    8.6      08 Jul 2017 01:16  Phos  3.2     07-08  Mg     2.2     07-08    TPro  6.5  /  Alb  3.7  /  TBili  1.3<H>  /  DBili  x   /  AST  322<H>  /  ALT  89<H>  /  AlkPhos  49  07-08    PT/INR - ( 08 Jul 2017 01:16 )   PT: 11.9 sec;   INR: 1.10 ratio         PTT - ( 08 Jul 2017 01:16 )  PTT:56.0 sec  CARDIAC MARKERS ( 08 Jul 2017 01:16 )  x     / 10.01 ng/mL / 1953 U/L / x     / 219.6 ng/mL  CARDIAC MARKERS ( 07 Jul 2017 18:01 )  x     / 7.83 ng/mL / 3351 U/L / x     / 442.9 ng/mL  CARDIAC MARKERS ( 07 Jul 2017 11:43 )  x     / 8.69 ng/mL / 4758 U/L / x     / 728.0 ng/mL  CARDIAC MARKERS ( 07 Jul 2017 06:00 )  x     / 16.22 ng/mL / 5925 U/L / x     / 1000 ng/mL            PLAN:  AWSTEMI - continue ASA, plavix, lipitor, captopril, and metoprolol                   -TTE in 72 hours                   - monitor right groin      ZACK Webster [id number]

## 2017-07-08 NOTE — PROGRESS NOTE ADULT - ASSESSMENT
56M with PMH HTN and HLD transfer from [facility] for urgent management of AWSTEMI s/p urgent Cath with POBA/PEALR to pLAD (100% occlusion) with IABP for hemodynamic support.  TVP placed for short run of AIVR and discvontinued

## 2017-07-09 LAB
ALBUMIN SERPL ELPH-MCNC: 3.8 G/DL — SIGNIFICANT CHANGE UP (ref 3.3–5)
ALP SERPL-CCNC: 55 U/L — SIGNIFICANT CHANGE UP (ref 40–120)
ALT FLD-CCNC: 62 U/L RC — HIGH (ref 10–45)
ANION GAP SERPL CALC-SCNC: 14 MMOL/L — SIGNIFICANT CHANGE UP (ref 5–17)
AST SERPL-CCNC: 123 U/L — HIGH (ref 10–40)
BILIRUB SERPL-MCNC: 0.8 MG/DL — SIGNIFICANT CHANGE UP (ref 0.2–1.2)
BUN SERPL-MCNC: 20 MG/DL — SIGNIFICANT CHANGE UP (ref 7–23)
CALCIUM SERPL-MCNC: 9 MG/DL — SIGNIFICANT CHANGE UP (ref 8.4–10.5)
CHLORIDE SERPL-SCNC: 103 MMOL/L — SIGNIFICANT CHANGE UP (ref 96–108)
CK MB BLD-MCNC: 5 % — HIGH (ref 0–3.5)
CK MB CFR SERPL CALC: 27.1 NG/ML — HIGH (ref 0–6.7)
CK SERPL-CCNC: 542 U/L — HIGH (ref 30–200)
CO2 SERPL-SCNC: 19 MMOL/L — LOW (ref 22–31)
CREAT SERPL-MCNC: 0.92 MG/DL — SIGNIFICANT CHANGE UP (ref 0.5–1.3)
GLUCOSE SERPL-MCNC: 95 MG/DL — SIGNIFICANT CHANGE UP (ref 70–99)
MAGNESIUM SERPL-MCNC: 2.2 MG/DL — SIGNIFICANT CHANGE UP (ref 1.6–2.6)
PHOSPHATE SERPL-MCNC: 3.5 MG/DL — SIGNIFICANT CHANGE UP (ref 2.5–4.5)
POTASSIUM SERPL-MCNC: 4.4 MMOL/L — SIGNIFICANT CHANGE UP (ref 3.5–5.3)
POTASSIUM SERPL-SCNC: 4.4 MMOL/L — SIGNIFICANT CHANGE UP (ref 3.5–5.3)
PROT SERPL-MCNC: 7.3 G/DL — SIGNIFICANT CHANGE UP (ref 6–8.3)
SODIUM SERPL-SCNC: 136 MMOL/L — SIGNIFICANT CHANGE UP (ref 135–145)
TROPONIN T SERPL-MCNC: 8.38 NG/ML — HIGH (ref 0–0.06)

## 2017-07-09 PROCEDURE — 93321 DOPPLER ECHO F-UP/LMTD STD: CPT | Mod: 26

## 2017-07-09 PROCEDURE — 93308 TTE F-UP OR LMTD: CPT | Mod: 26

## 2017-07-09 RX ORDER — LISINOPRIL 2.5 MG/1
5 TABLET ORAL DAILY
Qty: 0 | Refills: 0 | Status: DISCONTINUED | OUTPATIENT
Start: 2017-07-10 | End: 2017-07-10

## 2017-07-09 RX ADMIN — Medication 81 MILLIGRAM(S): at 11:06

## 2017-07-09 RX ADMIN — Medication 100 MILLIGRAM(S): at 14:11

## 2017-07-09 RX ADMIN — ATORVASTATIN CALCIUM 80 MILLIGRAM(S): 80 TABLET, FILM COATED ORAL at 21:03

## 2017-07-09 RX ADMIN — CAPTOPRIL 6.25 MILLIGRAM(S): 12.5 TABLET ORAL at 02:04

## 2017-07-09 RX ADMIN — CAPTOPRIL 6.25 MILLIGRAM(S): 12.5 TABLET ORAL at 11:05

## 2017-07-09 RX ADMIN — CLOPIDOGREL BISULFATE 75 MILLIGRAM(S): 75 TABLET, FILM COATED ORAL at 11:06

## 2017-07-09 RX ADMIN — Medication 12.5 MILLIGRAM(S): at 06:03

## 2017-07-09 RX ADMIN — Medication 12.5 MILLIGRAM(S): at 18:34

## 2017-07-09 NOTE — DISCHARGE NOTE ADULT - PATIENT PORTAL LINK FT
“You can access the [facility] Patient Portal, offered by [facility], by registering with the following website: [email or web address]”

## 2017-07-09 NOTE — DISCHARGE NOTE ADULT - CARE PLAN
Principal Discharge DX:	ST elevation myocardial infarction involving left anterior descending (LAD) coronary artery  Goal:	You will be free of chest pain or shortness of breath.  Instructions for follow-up, activity and diet:	No heavy lifting, strenuous activity, bending, straining, or unnecessary stair climbing for 2 weeks. No driving for 2 days. You may shower 24 hours following the procedure but avoid baths/swimming for 1 week. Check your groin site for bleeding and/or swelling daily following procedure and call your doctor immediately if it occurs or if you experience increased pain at the site. Follow up with your cardiologist in 1-2 weeks. You may call [facility] if you have any questions/concerns regarding your procedure [phone].  Secondary Diagnosis:	Complete heart block  Goal:	Patient will maintain normal sinus rhythm  Instructions for follow-up, activity and diet:	see above Principal Discharge DX:	ST elevation myocardial infarction involving left anterior descending (LAD) coronary artery  Goal:	You will be free of chest pain or shortness of breath.  Instructions for follow-up, activity and diet:	No heavy lifting, strenuous activity, bending, straining, or unnecessary stair climbing for 2 weeks. No driving for 2 days. You may shower 24 hours following the procedure but avoid baths/swimming for 1 week. Check your groin site for bleeding and/or swelling daily following procedure and call your doctor immediately if it occurs or if you experience increased pain at the site. Follow up with your cardiologist in 1-2 weeks. You may call [facility] if you have any questions/concerns regarding your procedure [phone].  Secondary Diagnosis:	Complete heart block  Goal:	Patient will maintain normal sinus rhythm  Instructions for follow-up, activity and diet:	see above Principal Discharge DX:	ST elevation myocardial infarction involving left anterior descending (LAD) coronary artery  Goal:	You will be free of chest pain or shortness of breath.  Instructions for follow-up, activity and diet:	No heavy lifting, strenuous activity, bending, straining, or unnecessary stair climbing for 2 weeks. No driving for 2 days. You may shower 24 hours following the procedure but avoid baths/swimming for 1 week. Check your groin site for bleeding and/or swelling daily following procedure and call your doctor immediately if it occurs or if you experience increased pain at the site. Follow up with your cardiologist in 1-2 weeks. You may call [facility] if you have any questions/concerns regarding your procedure [phone].  Secondary Diagnosis:	Complete heart block  Goal:	Patient will maintain normal sinus rhythm  Instructions for follow-up, activity and diet:	see above Principal Discharge DX:	ST elevation myocardial infarction involving left anterior descending (LAD) coronary artery  Goal:	You will be free of chest pain or shortness of breath.  Instructions for follow-up, activity and diet:	No heavy lifting, strenuous activity, bending, straining, or unnecessary stair climbing for 2 weeks. No driving for 2 days. You may shower 24 hours following the procedure but avoid baths/swimming for 1 week. Check your groin site for bleeding and/or swelling daily following procedure and call your doctor immediately if it occurs or if you experience increased pain at the site. Follow up with your cardiologist in 1-2 weeks. You may call [facility] if you have any questions/concerns regarding your procedure [phone].  Secondary Diagnosis:	Complete heart block  Goal:	Patient will maintain normal sinus rhythm  Instructions for follow-up, activity and diet:	see above Principal Discharge DX:	ST elevation myocardial infarction involving left anterior descending (LAD) coronary artery  Goal:	You will be free of chest pain or shortness of breath.  Instructions for follow-up, activity and diet:	No heavy lifting, strenuous activity, bending, straining, or unnecessary stair climbing for 2 weeks. No driving for 2 days. You may shower 24 hours following the procedure but avoid baths/swimming for 1 week. Check your groin site for bleeding and/or swelling daily following procedure and call your doctor immediately if it occurs or if you experience increased pain at the site. Follow up with your cardiologist in 1-2 weeks. You may call [facility] if you have any questions/concerns regarding your procedure [phone].  Secondary Diagnosis:	Complete heart block  Goal:	Patient will maintain normal sinus rhythm  Instructions for follow-up, activity and diet:	see above Principal Discharge DX:	ST elevation myocardial infarction involving left anterior descending (LAD) coronary artery  Goal:	You will be free of chest pain or shortness of breath.  Instructions for follow-up, activity and diet:	No heavy lifting, strenuous activity, bending, straining, or unnecessary stair climbing for 2 weeks. No driving for 2 days. You may shower 24 hours following the procedure but avoid baths/swimming for 1 week. Check your groin site for bleeding and/or swelling daily following procedure and call your doctor immediately if it occurs or if you experience increased pain at the site. Follow up with your cardiologist in 1-2 weeks. You may call [facility] if you have any questions/concerns regarding your procedure [phone].  Secondary Diagnosis:	Complete heart block  Goal:	Patient will maintain normal sinus rhythm  Instructions for follow-up, activity and diet:	see above Principal Discharge DX:	ST elevation myocardial infarction involving left anterior descending (LAD) coronary artery  Goal:	You will be free of chest pain or shortness of breath.  Instructions for follow-up, activity and diet:	No heavy lifting, strenuous activity, bending, straining, or unnecessary stair climbing for 2 weeks. No driving for 2 days. You may shower 24 hours following the procedure but avoid baths/swimming for 1 week. Check your groin site for bleeding and/or swelling daily following procedure and call your doctor immediately if it occurs or if you experience increased pain at the site. Follow up with your cardiologist in 1-2 weeks. You may call [facility] if you have any questions/concerns regarding your procedure [phone].  Secondary Diagnosis:	Complete heart block  Goal:	Patient will maintain normal sinus rhythm  Instructions for follow-up, activity and diet:	see above

## 2017-07-09 NOTE — DISCHARGE NOTE ADULT - PROVIDER TOKENS
ERASMO:'[id number]',ERASMO:'[id number]' FREE:[LAST:[[facility]],FIRST:[Dr],PHONE:[phone]],FAX:[(   )    -]],FREE:[LAST:[Cardiology],PHONE:[(   )    -],FAX:[(   )    -],ADDRESS:[Cardiology clinic   [address]]]

## 2017-07-09 NOTE — DISCHARGE NOTE ADULT - CARE PROVIDERS DIRECT ADDRESSES
[email or web address] ,DirectAddress_Unknown,DirectAddress_Unknown

## 2017-07-09 NOTE — DISCHARGE NOTE ADULT - CARE PROVIDER_API CALL
Jessica Jeffers), Cardiovascular Disease; Interventional Cardiology  [address] [place] [address]  Phone: [phone]  Fax: [phone]    Sukh Cortez), Cardiology; Internal Medicine  [address] [place] [id number]  Phone: [phone]  Fax: [phone] Dr Lionel  Phone: [phone]  Fax: (   )    -    Cardiology,   Cardiology clinic   [facility]  Phone: (   )    -  Fax: (   )    -

## 2017-07-09 NOTE — PROGRESS NOTE ADULT - SUBJECTIVE AND OBJECTIVE BOX
CHIEF COMPLAINT::    INTERVAL HISTORY:    REVIEW OF SYSTEMS: all others negative    MEDICATIONS  (STANDING):  atorvastatin 80 milliGRAM(s) Oral at bedtime  aspirin  chewable 81 milliGRAM(s) Oral daily  clopidogrel Tablet 75 milliGRAM(s) Oral daily  metoprolol 12.5 milliGRAM(s) Oral two times a day  captopril 6.25 milliGRAM(s) Oral every 8 hours  senna 2 Tablet(s) Oral at bedtime  docusate sodium 100 milliGRAM(s) Oral three times a day    MEDICATIONS  (PRN):      Objective:  Vital Signs Last 24 Hrs  T(C): 36.9 (2017 05:00), Max: 36.9 (2017 14:45)  T(F): 98.4 (2017 05:00), Max: 98.4 (2017 14:45)  HR: 72 (2017 06:00) (68 - 92)  BP: 102/71 (2017 06:00) (90/66 - 130/90)  BP(mean): 81 (2017 06:00) (68 - 108)  RR: 18 (2017 06:00) (10 - 37)  SpO2: 98% (2017 06:00) (96% - 99%)  ICU Vital Signs Last 24 Hrs  T(C): 36.9 (2017 05:00), Max: 36.9 (2017 14:45)  T(F): 98.4 (2017 05:00), Max: 98.4 (2017 14:45)  HR: 72 (2017 06:00) (68 - 92)  BP: 102/71 (2017 06:00) (90/66 - 130/90)  BP(mean): 81 (2017 06:00) (68 - 108)  ABP: --  ABP(mean): --  RR: 18 (2017 06:00) (10 - 37)  SpO2: 98% (2017 06:00) (96% - 99%)      Adult Advanced Hemodynamics Last 24 Hrs  CVP(mm Hg): --  CVP(cm H2O): --  CO: --  CI: --  PA: --  PA(mean): --  PCWP: --  SVR: --  SVRI: --  PVR: --  PVRI: --       @ 07:01  -   @ 07:00  --------------------------------------------------------  IN: 630 mL / OUT: 1326 mL / NET: -696 mL      Daily     Daily Weight in k.8 (2017 06:00)    PHYSICAL EXAM:      Constitutional:    Eyes:    ENMT:    Neck:    Breasts:    Back:    Respiratory:    Cardiovascular:    Gastrointestinal:    Genitourinary:    Rectal:    Extremities:    Vascular:    Neurological:    Skin:    Lymph Nodes:    Musculoskeletal:    Psychiatric:        TELEMETRY:     EKG:     CARDIAC CATH:     ECHO:    IMAGIN.7   12.1  )-----------( 202      ( 2017 01:16 )             41.5         136  |  103  |  20  ----------------------------<  95  4.4   |  19<L>  |  0.92    Ca    9.0      2017 05:35  Phos  3.5       Mg     2.2         TPro  7.3  /  Alb  3.8  /  TBili  0.8  /  DBili  x   /  AST  123<H>  /  ALT  62<H>  /  AlkPhos  55      LIVER FUNCTIONS - ( 2017 05:35 )  Alb: 3.8 g/dL / Pro: 7.3 g/dL / ALK PHOS: 55 U/L / ALT: 62 U/L RC / AST: 123 U/L / GGT: x           PT/INR - ( 2017 01:16 )   PT: 11.9 sec;   INR: 1.10 ratio         PTT - ( 2017 01:16 )  PTT:56.0 sec  Troponin T, Serum: 8.38 ng/mL ( @ 05:35)  Creatine Kinase, Serum: 542 U/L ( @ 05:35)  CKMB Units: 27.1 ng/mL ( @ 05:35)      *BLOOD GAS/ARTERIAL/MIXED/VENOUS  *LACTATE      HEALTH ISSUES - PROBLEM Dx:  HTN (hypertension): HTN (hypertension)  HLD (hyperlipidemia): HLD (hyperlipidemia)  Complete heart block: Complete heart block  Hypotension: Hypotension  ST elevation myocardial infarction involving left anterior descending (LAD) coronary artery: ST elevation myocardial infarction involving left anterior descending (LAD) coronary artery        HEALTH ISSUES - R/O PROBLEM Dx:      Mark Snider, CCU NP   # CHIEF COMPLAINT: AWSTEMI    INTERVAL HISTORY: No events overnight    REVIEW OF SYSTEMS: Denies Chest pain or SOB all others negative    MEDICATIONS  (STANDING):  atorvastatin 80 milliGRAM(s) Oral at bedtime  aspirin  chewable 81 milliGRAM(s) Oral daily  clopidogrel Tablet 75 milliGRAM(s) Oral daily  metoprolol 12.5 milliGRAM(s) Oral two times a day  captopril 6.25 milliGRAM(s) Oral every 8 hours  senna 2 Tablet(s) Oral at bedtime  docusate sodium 100 milliGRAM(s) Oral three times a day    MEDICATIONS  (PRN):      Objective:  Vital Signs Last 24 Hrs  T(C): 36.9 (2017 05:00), Max: 36.9 (2017 14:45)  T(F): 98.4 (2017 05:00), Max: 98.4 (2017 14:45)  HR: 72 (2017 06:00) (68 - 92)  BP: 102/71 (2017 06:00) (90/66 - 130/90)  BP(mean): 81 (2017 06:00) (68 - 108)  RR: 18 (2017 06:00) (10 - 37)  SpO2: 98% (2017 06:00) (96% - 99%)  ICU Vital Signs Last 24 Hrs  T(C): 36.9 (2017 05:00), Max: 36.9 (2017 14:45)  T(F): 98.4 (2017 05:00), Max: 98.4 (2017 14:45)  HR: 72 (2017 06:00) (68 - 92)  BP: 102/71 (2017 06:00) (90/66 - 130/90)  BP(mean): 81 (2017 06:00) (68 - 108)    RR: 18 (2017 06:00) (10 - 37)  SpO2: 98% (2017 06:00) (96% - 99%)            07-08 @ 07:01  -  -09 @ 07:00  --------------------------------------------------------  IN: 630 mL / OUT: 1326 mL / NET: -696 mL      Daily     Daily Weight in k.8 (2017 06:00)    PHYSICAL EXAM:      Constitutional: No Acute Distress    Neuro: Turkish Speaking A+OX3    Respiratory: CTA    Cardiovascular:    Gastrointestinal: +BS    Extremities: No Edema    Vascular: +2 Pedal Pulses          TELEMETRY:     EKG:     CARDIAC CATH:     ECHO:    IMAGIN.7   12.1  )-----------( 202      ( 2017 01:16 )             41.5         136  |  103  |  20  ----------------------------<  95  4.4   |  19<L>  |  0.92    Ca    9.0      2017 05:35  Phos  3.5       Mg     2.2         TPro  7.3  /  Alb  3.8  /  TBili  0.8  /  DBili  x   /  AST  123<H>  /  ALT  62<H>  /  AlkPhos  55      LIVER FUNCTIONS - ( 2017 05:35 )  Alb: 3.8 g/dL / Pro: 7.3 g/dL / ALK PHOS: 55 U/L / ALT: 62 U/L RC / AST: 123 U/L / GGT: x           PT/INR - ( 2017 01:16 )   PT: 11.9 sec;   INR: 1.10 ratio         PTT - ( 2017 01:16 )  PTT:56.0 sec  Troponin T, Serum: 8.38 ng/mL ( @ 05:35)  Creatine Kinase, Serum: 542 U/L ( @ 05:35)  CKMB Units: 27.1 ng/mL ( @ 05:35)      *BLOOD GAS/ARTERIAL/MIXED/VENOUS  *LACTATE      HEALTH ISSUES - PROBLEM Dx:  HTN (hypertension): HTN (hypertension)  HLD (hyperlipidemia): HLD (hyperlipidemia)  Complete heart block: Complete heart block  Hypotension: Hypotension  ST elevation myocardial infarction involving left anterior descending (LAD) coronary artery: ST elevation myocardial infarction involving left anterior descending (LAD) coronary artery        HEALTH ISSUES - R/O PROBLEM Dx:      Mark Snider, CCU NP   #

## 2017-07-09 NOTE — DISCHARGE NOTE ADULT - PLAN OF CARE
You will be free of chest pain or shortness of breath. No heavy lifting, strenuous activity, bending, straining, or unnecessary stair climbing for 2 weeks. No driving for 2 days. You may shower 24 hours following the procedure but avoid baths/swimming for 1 week. Check your groin site for bleeding and/or swelling daily following procedure and call your doctor immediately if it occurs or if you experience increased pain at the site. Follow up with your cardiologist in 1-2 weeks. You may call [facility] if you have any questions/concerns regarding your procedure [phone]. Patient will maintain normal sinus rhythm see above

## 2017-07-09 NOTE — PROGRESS NOTE ADULT - ASSESSMENT
This is a Guamanian speaking 56 year old male with past medical history of HTN and HLD transfer from [facility] for urgent management of AWSTEMI [facility] with [facility] for hemodynamic support.  TVP placed for short run of AIVR and discontinued with patient remaining in SR.  IABP discontinued with patient remaining hemodynamically stable and IABP insertion site with no hematoma.

## 2017-07-09 NOTE — DISCHARGE NOTE ADULT - MEDICATION SUMMARY - MEDICATIONS TO TAKE
I will START or STAY ON the medications listed below when I get home from the hospital:    aspirin 81 mg oral tablet, chewable  -- 1 tab(s) by mouth once a day  -- Indication: For ST elevation myocardial infarction (STEMI)    lisinopril 5 mg oral tablet  -- 1 tab(s) by mouth once a day  -- Indication: For ST elevation myocardial infarction (STEMI)    atorvastatin 80 mg oral tablet  -- 1 tab(s) by mouth once a day (at bedtime)  -- Indication: For ST elevation myocardial infarction (STEMI)    clopidogrel 75 mg oral tablet  -- 1 tab(s) by mouth once a day  -- Indication: For ST elevation myocardial infarction (STEMI)    metoprolol tartrate 25 mg oral tablet  -- 0.5 tab(s) by mouth 2 times a day  -- It is very important that you take or use this exactly as directed.  Do not skip doses or discontinue unless directed by your doctor.  May cause drowsiness.  Alcohol may intensify this effect.  Use care when operating dangerous machinery.  Some non-prescription drugs may aggravate your condition.  Read all labels carefully.  If a warning appears, check with your doctor before taking.  Take with food or milk.  This drug may impair the ability to drive or operate machinery.  Use care until you become familiar with its effects.    -- Indication: For ST elevation myocardial infarction (STEMI)

## 2017-07-10 VITALS — WEIGHT: 149.91 LBS

## 2017-07-10 DIAGNOSIS — I25.5 ISCHEMIC CARDIOMYOPATHY: ICD-10-CM

## 2017-07-10 PROBLEM — Z00.00 ENCOUNTER FOR PREVENTIVE HEALTH EXAMINATION: Status: ACTIVE | Noted: 2017-07-10

## 2017-07-10 PROCEDURE — 85730 THROMBOPLASTIN TIME PARTIAL: CPT

## 2017-07-10 PROCEDURE — 86900 BLOOD TYPING SEROLOGIC ABO: CPT

## 2017-07-10 PROCEDURE — C9606: CPT | Mod: LD

## 2017-07-10 PROCEDURE — 83036 HEMOGLOBIN GLYCOSYLATED A1C: CPT

## 2017-07-10 PROCEDURE — 93458 L HRT ARTERY/VENTRICLE ANGIO: CPT | Mod: 59

## 2017-07-10 PROCEDURE — 93005 ELECTROCARDIOGRAM TRACING: CPT

## 2017-07-10 PROCEDURE — C8924: CPT

## 2017-07-10 PROCEDURE — C1769: CPT

## 2017-07-10 PROCEDURE — 84443 ASSAY THYROID STIM HORMONE: CPT

## 2017-07-10 PROCEDURE — 84484 ASSAY OF TROPONIN QUANT: CPT

## 2017-07-10 PROCEDURE — 85610 PROTHROMBIN TIME: CPT

## 2017-07-10 PROCEDURE — 86901 BLOOD TYPING SEROLOGIC RH(D): CPT

## 2017-07-10 PROCEDURE — 83615 LACTATE (LD) (LDH) ENZYME: CPT

## 2017-07-10 PROCEDURE — 85027 COMPLETE CBC AUTOMATED: CPT

## 2017-07-10 PROCEDURE — 83735 ASSAY OF MAGNESIUM: CPT

## 2017-07-10 PROCEDURE — C1887: CPT

## 2017-07-10 PROCEDURE — 80061 LIPID PANEL: CPT

## 2017-07-10 PROCEDURE — 86850 RBC ANTIBODY SCREEN: CPT

## 2017-07-10 PROCEDURE — C1894: CPT

## 2017-07-10 PROCEDURE — C1725: CPT

## 2017-07-10 PROCEDURE — 84100 ASSAY OF PHOSPHORUS: CPT

## 2017-07-10 PROCEDURE — 93321 DOPPLER ECHO F-UP/LMTD STD: CPT

## 2017-07-10 PROCEDURE — 80053 COMPREHEN METABOLIC PANEL: CPT

## 2017-07-10 PROCEDURE — C1889: CPT

## 2017-07-10 PROCEDURE — 82553 CREATINE MB FRACTION: CPT

## 2017-07-10 PROCEDURE — 82550 ASSAY OF CK (CPK): CPT

## 2017-07-10 PROCEDURE — C8929: CPT

## 2017-07-10 PROCEDURE — 33967 INSERT I-AORT PERCUT DEVICE: CPT

## 2017-07-10 PROCEDURE — 71045 X-RAY EXAM CHEST 1 VIEW: CPT

## 2017-07-10 PROCEDURE — C1874: CPT

## 2017-07-10 RX ORDER — METOPROLOL TARTRATE 50 MG
0.5 TABLET ORAL
Qty: 30 | Refills: 0
Start: 2017-07-10 | End: 2017-08-09

## 2017-07-10 RX ORDER — CLOPIDOGREL BISULFATE 75 MG/1
1 TABLET, FILM COATED ORAL
Qty: 60 | Refills: 0
Start: 2017-07-10 | End: 2017-09-08

## 2017-07-10 RX ORDER — LISINOPRIL 2.5 MG/1
1 TABLET ORAL
Qty: 30 | Refills: 0
Start: 2017-07-10 | End: 2017-08-09

## 2017-07-10 RX ORDER — ASPIRIN/CALCIUM CARB/MAGNESIUM 324 MG
1 TABLET ORAL
Qty: 60 | Refills: 0
Start: 2017-07-10 | End: 2017-09-08

## 2017-07-10 RX ORDER — ATORVASTATIN CALCIUM 80 MG/1
1 TABLET, FILM COATED ORAL
Qty: 30 | Refills: 0
Start: 2017-07-10 | End: 2017-08-09

## 2017-07-10 RX ADMIN — CLOPIDOGREL BISULFATE 75 MILLIGRAM(S): 75 TABLET, FILM COATED ORAL at 11:43

## 2017-07-10 RX ADMIN — LISINOPRIL 5 MILLIGRAM(S): 2.5 TABLET ORAL at 06:34

## 2017-07-10 RX ADMIN — Medication 12.5 MILLIGRAM(S): at 06:35

## 2017-07-10 RX ADMIN — Medication 81 MILLIGRAM(S): at 11:43

## 2017-07-10 RX ADMIN — Medication 100 MILLIGRAM(S): at 06:34

## 2017-07-10 NOTE — PROGRESS NOTE ADULT - PROBLEM SELECTOR PLAN 1
DAPT, BB and ACEI Statin
S/P Cath POBA/PEARL to pLAD  DAPT, Lipitor  Meoprolol 12.5mg BID  Captopril 6.25mg TID  DASH Diet
s/p PEARL to pLAD  - Continue ASA, consider brilinta (currently on plavix)  - Continue heparin while on IABP  - Negative HgA1c. Pending lipid panel and TSH  - Continue atorva 80  - If sustained consider BB  - Pending TTE today prior to IABP removal
s/p POBA/PEARL to RCA  Continue DAPT, Lipitor, Metoprolol  Change Captopril to Lisinopril  DASH Diet

## 2017-07-10 NOTE — PROGRESS NOTE ADULT - PROBLEM SELECTOR PLAN 2
2/2 shock post intervention now resolving  IABP for removal  Heparin on hold
ACE I < BB  Follow up with [facility]
Currently has IABP in place.  - Worsens with ectopy  - Repeat CXR this AM for IABP placement  - Continue transvenous pacing  - Pending TTE today
Continue Metoprolol  Start Lisinopril  DASH Diet

## 2017-07-10 NOTE — PROGRESS NOTE ADULT - ASSESSMENT
55 Y/O Estonian speaking  M presented w AWSTEMI.. S/P pLAD lesion/PEARL c/b elevated LVEDP, IABP and TVP.  Now being discharged with extensive education and follow up instructions

## 2017-07-10 NOTE — PROGRESS NOTE ADULT - SUBJECTIVE AND OBJECTIVE BOX
HPI:  55 yo Prydeinig speaking M pmhx HTN, HLD who presents with anterolateral STEMI. Patient was taking train home from work when he noticed chest pain with numbness down left arm to his hand. He never had this pain before, and described the pain as "everything." It was associated with sweating, nausea and vomiting. He described the sensation of feeling intoxicated, but denied any recent drinking. This pain started at 6 pm and gradually became worse. 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 He also felt like passing out, but denied loss of consciousness.     He initially went to [facility], where he was noted to have ST segment elevations in I/aVL, V2-V5, with reciprocal depressions in II/III/aVF. He was loaded with 325 mg ASA and 600 plavix, bolused heparin and started on heparin gtt. He was also given nitroglycerin and metoprolol. He emergently was transferred to [facility] for cath. He had a POBA/PEARL x1 placed to proximal LAD (100% stenosed). His mid Cx was 50% stenosed and he had no flow limiting lesions in his RCA. His LVEDP was 31. He was given neosynephrine and had an IABP placed. Due to concerns of transient complete heart block had a TVP placed (in same access site of IABP).     While at the CCU his chest pain, nausea/vomiting, and sweating have resolved. He denied any pain in his left leg. He continues to have numbness in his left hand. His augmented BPs were in the 90s, with occasional runs of AIVR. Of note over the last few months he stopped taking his medications for his blood pressure. He does not remember the names of the medications he was previously on. (2017 21:20)        Overnight Events:    OBJECTIVE:  Review Of Systems:    Constitutional: Prydeinig speaking  Respiratory: no dyspnea  Cardiovascular: No chest pain        Allergy:  Allergies    No Known Allergies    Intolerances        Medications:  MEDICATIONS  (STANDING):  atorvastatin 80 milliGRAM(s) Oral at bedtime  aspirin  chewable 81 milliGRAM(s) Oral daily  clopidogrel Tablet 75 milliGRAM(s) Oral daily  metoprolol 12.5 milliGRAM(s) Oral two times a day  senna 2 Tablet(s) Oral at bedtime  docusate sodium 100 milliGRAM(s) Oral three times a day  lisinopril 5 milliGRAM(s) Oral daily    MEDICATIONS  (PRN):      PMH/PSH/FH/SH: [ ] Unchanged    Vitals:  T(C): 36.8 (07-10-17 @ 04:38), Max: 36.8 (17 @ 22:17)  HR: 71 (07-10-17 @ 04:38) (71 - 110)  BP: 96/66 (07-10-17 @ 04:38) (91/63 - 120/84)  BP(mean): 93 (17 @ 20:49) (79 - 103)  RR: 18 (07-10-17 @ 04:38) (11 - 321)  SpO2: 99% (07-10-17 @ 04:38) (96% - 100%)  Wt(kg): --  Daily     Daily Weight in k (10 Jul 2017 08:00)  I&O's Summary    2017 07:01  -  10 Jul 2017 07:00  --------------------------------------------------------  IN: 300 mL / OUT: 0 mL / NET: 300 mL        Labs:        136  |  103  |  20  ----------------------------<  95  4.4   |  19<L>  |  0.92    Ca    9.0      2017 05:35  Phos  3.5     -  Mg     2.2         TPro  7.3  /  Alb  3.8  /  TBili  0.8  /  DBili  x   /  AST  123<H>  /  ALT  62<H>  /  AlkPhos  55  07-09      CARDIAC MARKERS ( 2017 05:35 )  x     / 8.38 ng/mL / 542 U/L / x     / 27.1 ng/mL                  ECG:    Echo:    Stress Testing:     Cath:    Imaging:    Interpretation of Telemetry:      Physical Exam:    Appearance:   Cardiovascular:   Procedural Access Site:  Respiratory:  Gastrointestinal:   Extremities: HPI:  55 yo Setswana speaking M pmhx HTN, HLD who presents with anterolateral STEMI. Patient was taking train home from work when he noticed chest pain with numbness down left arm to his hand. He never had this pain before, and described the pain as "everything." It was associated with sweating, nausea and vomiting. He described the sensation of feeling intoxicated, but denied any recent drinking. This pain started at 6 pm and gradually became worse. 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 He also felt like passing out, but denied loss of consciousness.     He initially went to [facility], where he was noted to have ST segment elevations in I/aVL, V2-V5, with reciprocal depressions in II/III/aVF. He was loaded with 325 mg ASA and 600 plavix, bolused heparin and started on heparin gtt. He was also given nitroglycerin and metoprolol. He emergently was transferred to [facility] for cath. He had a POBA/PEARL x1 placed to proximal LAD (100% stenosed). His mid Cx was 50% stenosed and he had no flow limiting lesions in his RCA. His LVEDP was 31. He was given neosynephrine and had an IABP placed. Due to concerns of transient complete heart block had a TVP placed (in same access site of IABP).     While at the CCU his chest pain, nausea/vomiting, and sweating have resolved. He denied any pain in his left leg. He continues to have numbness in his left hand. His augmented BPs were in the 90s, with occasional runs of AIVR. Of note over the last few months he stopped taking his medications for his blood pressure. He does not remember the names of the medications he was previously on. (2017 21:20)        Overnight Events:None.. Refused AM labs    OBJECTIVE:  Review Of Systems:    Constitutional: Setswana speaking  Respiratory: no dyspnea  Cardiovascular: No chest pain        Allergy:  Allergies    No Known Allergies    Intolerances        Medications:  MEDICATIONS  (STANDING):  atorvastatin 80 milliGRAM(s) Oral at bedtime  aspirin  chewable 81 milliGRAM(s) Oral daily  clopidogrel Tablet 75 milliGRAM(s) Oral daily  metoprolol 12.5 milliGRAM(s) Oral two times a day  senna 2 Tablet(s) Oral at bedtime  docusate sodium 100 milliGRAM(s) Oral three times a day  lisinopril 5 milliGRAM(s) Oral daily    MEDICATIONS  (PRN):      PMH/PSH/FH/SH: [ ] Unchanged    Vitals:  T(C): 36.8 (07-10-17 @ 04:38), Max: 36.8 (17 @ 22:17)  HR: 71 (07-10-17 @ 04:38) (71 - 110)  BP: 96/66 (07-10-17 @ 04:38) (91/63 - 120/84)  BP(mean): 93 (17 @ 20:49) (79 - 103)  RR: 18 (07-10-17 @ 04:38) (11 - 321)  SpO2: 99% (07-10-17 @ 04:38) (96% - 100%)  Wt(kg): --  Daily     Daily Weight in k (10 Jul 2017 08:00)  I&O's Summary    2017 07:01  -  10 Jul 2017 07:00  --------------------------------------------------------  IN: 300 mL / OUT: 0 mL / NET: 300 mL        Labs:        136  |  103  |  20  ----------------------------<  95  4.4   |  19<L>  |  0.92    Ca    9.0      2017 05:35  Phos  3.5       Mg     2.2         TPro  7.3  /  Alb  3.8  /  TBili  0.8  /  DBili  x   /  AST  123<H>  /  ALT  62<H>  /  AlkPhos  55  07-09      CARDIAC MARKERS ( 2017 05:35 )  x     / 8.38 ng/mL / 542 U/L / x     / 27.1 ng/mL                  ECG:  Echo: PEARL to p [id number]%     Stress Testing:     Cath:    Imaging:    Interpretation of Telemetry:Sinus 70's      Physical Exam:    Appearance: Well developed  Cardiovascular: S1 S2 no gallop or murmur  Procedural Access Site:Right groin, No ecchymosis or hematoma  Respiratory:Lungs clear   Gastrointestinal:   Extremities:No edema

## 2017-07-18 ENCOUNTER — OUTPATIENT (OUTPATIENT)
Dept: OUTPATIENT SERVICES | Facility: HOSPITAL | Age: 57
LOS: 1 days | End: 2017-07-18
Payer: SELF-PAY

## 2017-07-18 ENCOUNTER — APPOINTMENT (OUTPATIENT)
Dept: CARDIOLOGY | Facility: HOSPITAL | Age: 57
End: 2017-07-18

## 2017-07-18 VITALS
HEIGHT: 66 IN | OXYGEN SATURATION: 99 % | DIASTOLIC BLOOD PRESSURE: 70 MMHG | WEIGHT: 170 LBS | SYSTOLIC BLOOD PRESSURE: 108 MMHG | HEART RATE: 60 BPM | BODY MASS INDEX: 27.32 KG/M2

## 2017-07-18 DIAGNOSIS — E78.5 HYPERLIPIDEMIA, UNSPECIFIED: ICD-10-CM

## 2017-07-18 DIAGNOSIS — I25.2 OLD MYOCARDIAL INFARCTION: ICD-10-CM

## 2017-07-18 DIAGNOSIS — I25.10 ATHEROSCLEROTIC HEART DISEASE OF NATIVE CORONARY ARTERY WITHOUT ANGINA PECTORIS: ICD-10-CM

## 2017-07-18 DIAGNOSIS — Z98.890 OTHER SPECIFIED POSTPROCEDURAL STATES: Chronic | ICD-10-CM

## 2017-07-18 DIAGNOSIS — I10 ESSENTIAL (PRIMARY) HYPERTENSION: ICD-10-CM

## 2017-07-18 PROCEDURE — G0463: CPT

## 2017-07-27 DIAGNOSIS — I21.3 ST ELEVATION (STEMI) MYOCARDIAL INFARCTION OF UNSPECIFIED SITE: ICD-10-CM

## 2017-08-01 ENCOUNTER — NON-APPOINTMENT (OUTPATIENT)
Age: 57
End: 2017-08-01

## 2017-08-01 ENCOUNTER — OUTPATIENT (OUTPATIENT)
Dept: OUTPATIENT SERVICES | Facility: HOSPITAL | Age: 57
LOS: 1 days | End: 2017-08-01
Payer: SELF-PAY

## 2017-08-01 ENCOUNTER — APPOINTMENT (OUTPATIENT)
Dept: CARDIOLOGY | Facility: HOSPITAL | Age: 57
End: 2017-08-01

## 2017-08-01 VITALS
SYSTOLIC BLOOD PRESSURE: 103 MMHG | DIASTOLIC BLOOD PRESSURE: 69 MMHG | HEIGHT: 66 IN | WEIGHT: 170 LBS | HEART RATE: 62 BPM | BODY MASS INDEX: 27.32 KG/M2 | OXYGEN SATURATION: 100 %

## 2017-08-01 DIAGNOSIS — I21.3 ST ELEVATION (STEMI) MYOCARDIAL INFARCTION OF UNSPECIFIED SITE: ICD-10-CM

## 2017-08-01 DIAGNOSIS — Z98.890 OTHER SPECIFIED POSTPROCEDURAL STATES: Chronic | ICD-10-CM

## 2017-08-01 DIAGNOSIS — I25.10 ATHEROSCLEROTIC HEART DISEASE OF NATIVE CORONARY ARTERY WITHOUT ANGINA PECTORIS: ICD-10-CM

## 2017-08-01 PROCEDURE — G0463: CPT

## 2017-08-01 RX ORDER — METOPROLOL TARTRATE 25 MG/1
25 TABLET, FILM COATED ORAL TWICE DAILY
Qty: 30 | Refills: 3 | Status: DISCONTINUED | COMMUNITY
Start: 2017-07-18 | End: 2017-08-01

## 2017-08-02 LAB
CHOLEST SERPL-MCNC: 112 MG/DL
CHOLEST/HDLC SERPL: 4 RATIO
HDLC SERPL-MCNC: 28 MG/DL
LDLC SERPL CALC-MCNC: 52 MG/DL
TRIGL SERPL-MCNC: 160 MG/DL

## 2017-08-04 DIAGNOSIS — I21.09 ST ELEVATION (STEMI) MYOCARDIAL INFARCTION INVOLVING OTHER CORONARY ARTERY OF ANTERIOR WALL: ICD-10-CM

## 2017-12-05 ENCOUNTER — APPOINTMENT (OUTPATIENT)
Dept: CARDIOLOGY | Facility: HOSPITAL | Age: 57
End: 2017-12-05

## 2017-12-05 ENCOUNTER — OUTPATIENT (OUTPATIENT)
Dept: OUTPATIENT SERVICES | Facility: HOSPITAL | Age: 57
LOS: 1 days | End: 2017-12-05
Payer: SELF-PAY

## 2017-12-05 VITALS
WEIGHT: 170 LBS | HEIGHT: 66 IN | BODY MASS INDEX: 27.32 KG/M2 | OXYGEN SATURATION: 97 % | SYSTOLIC BLOOD PRESSURE: 129 MMHG | HEART RATE: 75 BPM | DIASTOLIC BLOOD PRESSURE: 80 MMHG

## 2017-12-05 DIAGNOSIS — Z98.890 OTHER SPECIFIED POSTPROCEDURAL STATES: Chronic | ICD-10-CM

## 2017-12-05 DIAGNOSIS — I25.10 ATHEROSCLEROTIC HEART DISEASE OF NATIVE CORONARY ARTERY WITHOUT ANGINA PECTORIS: ICD-10-CM

## 2017-12-05 PROCEDURE — G0463: CPT

## 2017-12-06 DIAGNOSIS — I21.3 ST ELEVATION (STEMI) MYOCARDIAL INFARCTION OF UNSPECIFIED SITE: ICD-10-CM

## 2018-02-06 ENCOUNTER — NON-APPOINTMENT (OUTPATIENT)
Age: 58
End: 2018-02-06

## 2018-02-06 ENCOUNTER — APPOINTMENT (OUTPATIENT)
Dept: CARDIOLOGY | Facility: HOSPITAL | Age: 58
End: 2018-02-06

## 2018-02-06 ENCOUNTER — OUTPATIENT (OUTPATIENT)
Dept: OUTPATIENT SERVICES | Facility: HOSPITAL | Age: 58
LOS: 1 days | End: 2018-02-06
Payer: SELF-PAY

## 2018-02-06 VITALS
OXYGEN SATURATION: 99 % | BODY MASS INDEX: 27.32 KG/M2 | WEIGHT: 170 LBS | DIASTOLIC BLOOD PRESSURE: 78 MMHG | HEIGHT: 66 IN | HEART RATE: 84 BPM | SYSTOLIC BLOOD PRESSURE: 119 MMHG

## 2018-02-06 DIAGNOSIS — Z98.890 OTHER SPECIFIED POSTPROCEDURAL STATES: Chronic | ICD-10-CM

## 2018-02-06 DIAGNOSIS — I25.10 ATHEROSCLEROTIC HEART DISEASE OF NATIVE CORONARY ARTERY WITHOUT ANGINA PECTORIS: ICD-10-CM

## 2018-02-06 PROCEDURE — G0463: CPT

## 2018-02-06 PROCEDURE — 93005 ELECTROCARDIOGRAM TRACING: CPT

## 2018-02-07 DIAGNOSIS — I21.3 ST ELEVATION (STEMI) MYOCARDIAL INFARCTION OF UNSPECIFIED SITE: ICD-10-CM

## 2018-02-08 LAB
ANION GAP SERPL CALC-SCNC: 17 MMOL/L
BUN SERPL-MCNC: 18 MG/DL
CALCIUM SERPL-MCNC: 9.8 MG/DL
CHLORIDE SERPL-SCNC: 104 MMOL/L
CO2 SERPL-SCNC: 22 MMOL/L
CREAT SERPL-MCNC: 1.04 MG/DL
GLUCOSE SERPL-MCNC: 78 MG/DL
POTASSIUM SERPL-SCNC: 4.6 MMOL/L
SODIUM SERPL-SCNC: 143 MMOL/L

## 2018-03-27 ENCOUNTER — APPOINTMENT (OUTPATIENT)
Dept: CARDIOLOGY | Facility: HOSPITAL | Age: 58
End: 2018-03-27

## 2018-03-27 ENCOUNTER — OUTPATIENT (OUTPATIENT)
Dept: OUTPATIENT SERVICES | Facility: HOSPITAL | Age: 58
LOS: 1 days | End: 2018-03-27
Payer: SELF-PAY

## 2018-03-27 VITALS
HEIGHT: 66 IN | OXYGEN SATURATION: 96 % | DIASTOLIC BLOOD PRESSURE: 75 MMHG | SYSTOLIC BLOOD PRESSURE: 116 MMHG | HEART RATE: 85 BPM

## 2018-03-27 DIAGNOSIS — I25.10 ATHEROSCLEROTIC HEART DISEASE OF NATIVE CORONARY ARTERY WITHOUT ANGINA PECTORIS: ICD-10-CM

## 2018-03-27 DIAGNOSIS — Z98.890 OTHER SPECIFIED POSTPROCEDURAL STATES: Chronic | ICD-10-CM

## 2018-03-27 DIAGNOSIS — I21.3 ST ELEVATION (STEMI) MYOCARDIAL INFARCTION OF UNSPECIFIED SITE: ICD-10-CM

## 2018-03-27 PROCEDURE — G0463: CPT

## 2018-03-29 PROBLEM — I21.3 STEMI (ST ELEVATION MYOCARDIAL INFARCTION): Status: ACTIVE | Noted: 2017-12-05

## 2018-04-02 DIAGNOSIS — I21.3 ST ELEVATION (STEMI) MYOCARDIAL INFARCTION OF UNSPECIFIED SITE: ICD-10-CM

## 2018-05-04 ENCOUNTER — APPOINTMENT (OUTPATIENT)
Dept: CV DIAGNOSITCS | Facility: HOSPITAL | Age: 58
End: 2018-05-04

## 2018-05-04 ENCOUNTER — OUTPATIENT (OUTPATIENT)
Dept: OUTPATIENT SERVICES | Facility: HOSPITAL | Age: 58
LOS: 1 days | End: 2018-05-04
Payer: SELF-PAY

## 2018-05-04 DIAGNOSIS — I21.3 ST ELEVATION (STEMI) MYOCARDIAL INFARCTION OF UNSPECIFIED SITE: ICD-10-CM

## 2018-05-04 DIAGNOSIS — I25.10 ATHEROSCLEROTIC HEART DISEASE OF NATIVE CORONARY ARTERY WITHOUT ANGINA PECTORIS: ICD-10-CM

## 2018-05-04 DIAGNOSIS — Z98.890 OTHER SPECIFIED POSTPROCEDURAL STATES: Chronic | ICD-10-CM

## 2018-05-04 PROCEDURE — 93306 TTE W/DOPPLER COMPLETE: CPT | Mod: 26

## 2018-05-04 PROCEDURE — 93306 TTE W/DOPPLER COMPLETE: CPT

## 2018-10-02 ENCOUNTER — APPOINTMENT (OUTPATIENT)
Dept: CARDIOLOGY | Facility: HOSPITAL | Age: 58
End: 2018-10-02

## 2018-10-02 ENCOUNTER — OUTPATIENT (OUTPATIENT)
Dept: OUTPATIENT SERVICES | Facility: HOSPITAL | Age: 58
LOS: 1 days | End: 2018-10-02
Payer: SELF-PAY

## 2018-10-02 DIAGNOSIS — I25.10 ATHEROSCLEROTIC HEART DISEASE OF NATIVE CORONARY ARTERY WITHOUT ANGINA PECTORIS: ICD-10-CM

## 2018-10-02 DIAGNOSIS — Z98.890 OTHER SPECIFIED POSTPROCEDURAL STATES: Chronic | ICD-10-CM

## 2018-10-02 PROBLEM — E78.5 HYPERLIPIDEMIA, UNSPECIFIED: Chronic | Status: ACTIVE | Noted: 2017-07-06

## 2018-10-02 PROBLEM — I10 ESSENTIAL (PRIMARY) HYPERTENSION: Chronic | Status: ACTIVE | Noted: 2017-07-06

## 2018-10-02 PROCEDURE — G0463: CPT

## 2019-08-26 NOTE — DISCHARGE NOTE ADULT - HOSPITAL COURSE
Patient's daughter Yobani Calixto is requesting a PT order for patient's back to be sent to [facility]   phone # [phone] fx # [phone] ,    Yobani Calixto can be reached at [phone] to confirm order can be placed or not 55 yo M HTN, HLD, transferred from [place] w/ AWSTEMI s/p POBA/PEARL x 1 100% pLAD, in labs w/ hypotension, intermittent CHB and AIVR s/p IABP and TVP placement via R groin. TVP d/suyapa. IABP d/suyapa on 7/8/17. Patient had no further issues with CHB. He had a follow up echo 72 hours post anterior wall STEMI which showed no LV thrombus. 55 yo M HTN, HLD, transferred from [place] w/ AWSTEMI s/p POBA/PEARL x 1 100% pLAD, in labs w/ hypotension, intermittent CHB and AIVR s/p IABP and TVP placement via R groin. TVP d/suyapa. IABP d/suyapa on 7/8/17. Patient had no further issues with CHB. He had a follow up echo 72 hours post anterior wall STEMI which showed no LV thrombus.  Pts post procedural course was uneventful, He was discharged on good   medical management.  Through an  medical and follow up instructions were given

## 2020-03-24 ENCOUNTER — APPOINTMENT (OUTPATIENT)
Dept: CARDIOLOGY | Facility: HOSPITAL | Age: 60
End: 2020-03-24

## 2020-05-12 NOTE — PATIENT PROFILE ADULT. - NS PRO ABUSE SCREEN SUSPICION NEGLECT YN
Svetlana Sanders  19 y.o. female  2000  [address] [address] (home)      [address]:    [facility] Telephone Encounter  Micheline Dietrich MD       Encounter Date: 5/11/2020 at 8:16 AM    First attempt to contact patient regarding missed appointment made.  Will call at another time    Micheline Dietrich MD  [facility] Medicine Resident  PGY-3 no

## 2020-07-16 ENCOUNTER — APPOINTMENT (OUTPATIENT)
Dept: CARDIOLOGY | Facility: HOSPITAL | Age: 60
End: 2020-07-16

## 2020-09-10 ENCOUNTER — APPOINTMENT (OUTPATIENT)
Dept: CARDIOLOGY | Facility: HOSPITAL | Age: 60
End: 2020-09-10

## 2020-09-10 ENCOUNTER — OUTPATIENT (OUTPATIENT)
Dept: OUTPATIENT SERVICES | Facility: HOSPITAL | Age: 60
LOS: 1 days | End: 2020-09-10
Payer: SELF-PAY

## 2020-09-10 ENCOUNTER — NON-APPOINTMENT (OUTPATIENT)
Age: 60
End: 2020-09-10

## 2020-09-10 VITALS
DIASTOLIC BLOOD PRESSURE: 77 MMHG | SYSTOLIC BLOOD PRESSURE: 129 MMHG | OXYGEN SATURATION: 98 % | HEART RATE: 65 BPM | HEIGHT: 66 IN

## 2020-09-10 DIAGNOSIS — I25.10 ATHEROSCLEROTIC HEART DISEASE OF NATIVE CORONARY ARTERY WITHOUT ANGINA PECTORIS: ICD-10-CM

## 2020-09-10 DIAGNOSIS — Z98.890 OTHER SPECIFIED POSTPROCEDURAL STATES: Chronic | ICD-10-CM

## 2020-09-10 PROCEDURE — G0463: CPT

## 2020-09-10 PROCEDURE — 93005 ELECTROCARDIOGRAM TRACING: CPT

## 2020-09-10 RX ORDER — CLOPIDOGREL BISULFATE 75 MG/1
75 TABLET, FILM COATED ORAL
Qty: 90 | Refills: 3 | Status: DISCONTINUED | COMMUNITY
Start: 2017-07-18 | End: 2020-09-10

## 2020-09-10 NOTE — PHYSICAL EXAM
[General Appearance - Well Developed] : well developed [Normal Appearance] : normal appearance [Well Groomed] : well groomed [General Appearance - Well Nourished] : well nourished [No Deformities] : no deformities [General Appearance - In No Acute Distress] : no acute distress [Normal Oral Mucosa] : normal oral mucosa [No Oral Pallor] : no oral pallor [No Oral Cyanosis] : no oral cyanosis [Normal Jugular Venous A Waves Present] : normal jugular venous A waves present [Normal Jugular Venous V Waves Present] : normal jugular venous V waves present [No Jugular Venous Bonds A Waves] : no jugular venous bonds A waves [Heart Rate And Rhythm] : heart rate and rhythm were normal [Heart Sounds] : normal S1 and S2 [Murmurs] : no murmurs present [Respiration, Rhythm And Depth] : normal respiratory rhythm and effort [Exaggerated Use Of Accessory Muscles For Inspiration] : no accessory muscle use [Auscultation Breath Sounds / Voice Sounds] : lungs were clear to auscultation bilaterally [Abdomen Soft] : soft [Abdomen Mass (___ Cm)] : no abdominal mass palpated [Abdomen Tenderness] : non-tender [Nail Clubbing] : no clubbing of the fingernails [Cyanosis, Localized] : no localized cyanosis [Petechial Hemorrhages (___cm)] : no petechial hemorrhages [] : no ischemic changes [FreeTextEntry1] : trace ankle edema

## 2020-09-10 NOTE — ASSESSMENT
[FreeTextEntry1] : 56 y/o M with history of HTN, HLD, anterior wall STEMI on 7/6/2017 w/ a drug-eluting stent inserted on a 100% lesion in the proximal LAD. Pt also had a Mid circumflex 50% stenosis which was not intervened on. He has no chest pain or shortness of breath but reports 2 years of left index finger and middle finger numbness. \par \par #AWMI - TTE 7/7/2017 with Mild-moderate segmental LV systolic dysfunction. EF 40-45%\par - left index/middle finger numbness seems to be related to carpal tunnel, he does have significant cardiac history and risk factors --given these symptoms and pt's concerns that this could be cardiac-related, will pursue an exercise nuclear stress test to evaluate for ischemia\par - c/w ASA 81 daily\par - c/w atorvastatin 20 PO daily\par - c/w Lisinopril 10 daily\par - on lopressor 25 q12h, would switch to Toprol 50 XL daily\par \par #HTN - Stressed importance of compliance with medications. \par - c/w lisinopril 10 daily\par \par RTC after exercise nuclear stress test

## 2020-09-10 NOTE — HISTORY OF PRESENT ILLNESS
[FreeTextEntry1] : 56 y/o M with pmhx of HTN, HLD, anterior wall STEMI on 7/6/2017 w/ a drug-eluting stent inserted on a 100% lesion in the proximal LAD w/ subsequent mild to moderate LV systolic dysfunction and EF of 40-45%. Pt also had a mid circumflex 50 % stenosis which was not intervened on. \par \par He was last seen by Dr. Michael 10/2/2018. He was tolerating medications at the time, has no limitations in exercise tolerance. \par \par Today, he reports intermittently not taking his medications. He is currently off clopidogrel and has not been taking his metoprolol consistently. He also reports no CP/SOB, palpitations, COELLO, orthopnea. However, he reports L index and middle finger numbness. This is not associated with exertion. It occurs all the time and occasionally feels like "pins and needles." \par

## 2020-09-14 DIAGNOSIS — I10 ESSENTIAL (PRIMARY) HYPERTENSION: ICD-10-CM

## 2021-11-15 ENCOUNTER — INPATIENT (INPATIENT)
Facility: HOSPITAL | Age: 61
LOS: 0 days | Discharge: LEFT BEFORE DISCHARGE | DRG: 313 | End: 2021-11-15
Attending: INTERNAL MEDICINE | Admitting: INTERNAL MEDICINE
Payer: MEDICAID

## 2021-11-15 VITALS
HEART RATE: 75 BPM | DIASTOLIC BLOOD PRESSURE: 99 MMHG | TEMPERATURE: 98 F | WEIGHT: 179.9 LBS | HEIGHT: 66 IN | RESPIRATION RATE: 18 BRPM | SYSTOLIC BLOOD PRESSURE: 165 MMHG | OXYGEN SATURATION: 96 %

## 2021-11-15 VITALS
HEART RATE: 75 BPM | RESPIRATION RATE: 17 BRPM | DIASTOLIC BLOOD PRESSURE: 99 MMHG | OXYGEN SATURATION: 99 % | SYSTOLIC BLOOD PRESSURE: 162 MMHG

## 2021-11-15 DIAGNOSIS — R07.9 CHEST PAIN, UNSPECIFIED: ICD-10-CM

## 2021-11-15 DIAGNOSIS — Z98.890 OTHER SPECIFIED POSTPROCEDURAL STATES: Chronic | ICD-10-CM

## 2021-11-15 LAB
ALBUMIN SERPL ELPH-MCNC: 4.4 G/DL — SIGNIFICANT CHANGE UP (ref 3.3–5)
ALP SERPL-CCNC: 71 U/L — SIGNIFICANT CHANGE UP (ref 40–120)
ALT FLD-CCNC: 15 U/L — SIGNIFICANT CHANGE UP (ref 10–45)
ANION GAP SERPL CALC-SCNC: 11 MMOL/L — SIGNIFICANT CHANGE UP (ref 5–17)
AST SERPL-CCNC: 18 U/L — SIGNIFICANT CHANGE UP (ref 10–40)
BASOPHILS # BLD AUTO: 0.04 K/UL — SIGNIFICANT CHANGE UP (ref 0–0.2)
BASOPHILS NFR BLD AUTO: 0.5 % — SIGNIFICANT CHANGE UP (ref 0–2)
BILIRUB SERPL-MCNC: 0.5 MG/DL — SIGNIFICANT CHANGE UP (ref 0.2–1.2)
BUN SERPL-MCNC: 13 MG/DL — SIGNIFICANT CHANGE UP (ref 7–23)
CALCIUM SERPL-MCNC: 9.7 MG/DL — SIGNIFICANT CHANGE UP (ref 8.4–10.5)
CHLORIDE SERPL-SCNC: 101 MMOL/L — SIGNIFICANT CHANGE UP (ref 96–108)
CO2 SERPL-SCNC: 25 MMOL/L — SIGNIFICANT CHANGE UP (ref 22–31)
CREAT SERPL-MCNC: 0.91 MG/DL — SIGNIFICANT CHANGE UP (ref 0.5–1.3)
EOSINOPHIL # BLD AUTO: 0.01 K/UL — SIGNIFICANT CHANGE UP (ref 0–0.5)
EOSINOPHIL NFR BLD AUTO: 0.1 % — SIGNIFICANT CHANGE UP (ref 0–6)
GLUCOSE SERPL-MCNC: 100 MG/DL — HIGH (ref 70–99)
HCT VFR BLD CALC: 41.8 % — SIGNIFICANT CHANGE UP (ref 39–50)
HGB BLD-MCNC: 14.2 G/DL — SIGNIFICANT CHANGE UP (ref 13–17)
IMM GRANULOCYTES NFR BLD AUTO: 0.7 % — SIGNIFICANT CHANGE UP (ref 0–1.5)
LYMPHOCYTES # BLD AUTO: 1.19 K/UL — SIGNIFICANT CHANGE UP (ref 1–3.3)
LYMPHOCYTES # BLD AUTO: 14.1 % — SIGNIFICANT CHANGE UP (ref 13–44)
MAGNESIUM SERPL-MCNC: 2.2 MG/DL — SIGNIFICANT CHANGE UP (ref 1.6–2.6)
MCHC RBC-ENTMCNC: 29.5 PG — SIGNIFICANT CHANGE UP (ref 27–34)
MCHC RBC-ENTMCNC: 34 GM/DL — SIGNIFICANT CHANGE UP (ref 32–36)
MCV RBC AUTO: 86.9 FL — SIGNIFICANT CHANGE UP (ref 80–100)
MONOCYTES # BLD AUTO: 0.58 K/UL — SIGNIFICANT CHANGE UP (ref 0–0.9)
MONOCYTES NFR BLD AUTO: 6.9 % — SIGNIFICANT CHANGE UP (ref 2–14)
NEUTROPHILS # BLD AUTO: 6.53 K/UL — SIGNIFICANT CHANGE UP (ref 1.8–7.4)
NEUTROPHILS NFR BLD AUTO: 77.7 % — HIGH (ref 43–77)
NRBC # BLD: 0 /100 WBCS — SIGNIFICANT CHANGE UP (ref 0–0)
NT-PROBNP SERPL-SCNC: 829 PG/ML — HIGH (ref 0–300)
PLATELET # BLD AUTO: 245 K/UL — SIGNIFICANT CHANGE UP (ref 150–400)
POTASSIUM SERPL-MCNC: 3.9 MMOL/L — SIGNIFICANT CHANGE UP (ref 3.5–5.3)
POTASSIUM SERPL-SCNC: 3.9 MMOL/L — SIGNIFICANT CHANGE UP (ref 3.5–5.3)
PROT SERPL-MCNC: 7 G/DL — SIGNIFICANT CHANGE UP (ref 6–8.3)
RBC # BLD: 4.81 M/UL — SIGNIFICANT CHANGE UP (ref 4.2–5.8)
RBC # FLD: 12.4 % — SIGNIFICANT CHANGE UP (ref 10.3–14.5)
SARS-COV-2 RNA SPEC QL NAA+PROBE: SIGNIFICANT CHANGE UP
SODIUM SERPL-SCNC: 137 MMOL/L — SIGNIFICANT CHANGE UP (ref 135–145)
TROPONIN T, HIGH SENSITIVITY RESULT: 8 NG/L — SIGNIFICANT CHANGE UP (ref 0–51)
WBC # BLD: 8.41 K/UL — SIGNIFICANT CHANGE UP (ref 3.8–10.5)
WBC # FLD AUTO: 8.41 K/UL — SIGNIFICANT CHANGE UP (ref 3.8–10.5)

## 2021-11-15 PROCEDURE — U0003: CPT

## 2021-11-15 PROCEDURE — 83735 ASSAY OF MAGNESIUM: CPT

## 2021-11-15 PROCEDURE — 71046 X-RAY EXAM CHEST 2 VIEWS: CPT

## 2021-11-15 PROCEDURE — 93010 ELECTROCARDIOGRAM REPORT: CPT

## 2021-11-15 PROCEDURE — 99285 EMERGENCY DEPT VISIT HI MDM: CPT

## 2021-11-15 PROCEDURE — 36000 PLACE NEEDLE IN VEIN: CPT

## 2021-11-15 PROCEDURE — 93926 LOWER EXTREMITY STUDY: CPT

## 2021-11-15 PROCEDURE — 93306 TTE W/DOPPLER COMPLETE: CPT

## 2021-11-15 PROCEDURE — 83880 ASSAY OF NATRIURETIC PEPTIDE: CPT

## 2021-11-15 PROCEDURE — 71046 X-RAY EXAM CHEST 2 VIEWS: CPT | Mod: 26

## 2021-11-15 PROCEDURE — U0005: CPT

## 2021-11-15 PROCEDURE — 85025 COMPLETE CBC W/AUTO DIFF WBC: CPT

## 2021-11-15 PROCEDURE — 93306 TTE W/DOPPLER COMPLETE: CPT | Mod: 26

## 2021-11-15 PROCEDURE — 99285 EMERGENCY DEPT VISIT HI MDM: CPT | Mod: 25

## 2021-11-15 PROCEDURE — 80053 COMPREHEN METABOLIC PANEL: CPT

## 2021-11-15 PROCEDURE — 84484 ASSAY OF TROPONIN QUANT: CPT

## 2021-11-15 RX ORDER — ASPIRIN/CALCIUM CARB/MAGNESIUM 324 MG
162 TABLET ORAL ONCE
Refills: 0 | Status: COMPLETED | OUTPATIENT
Start: 2021-11-15 | End: 2021-11-15

## 2021-11-15 RX ADMIN — Medication 162 MILLIGRAM(S): at 16:18

## 2021-11-15 NOTE — ED ADULT NURSE NOTE - OBJECTIVE STATEMENT
· Chief Complaint: The patient is a 61y Male complaining of chest pain.  · HPI Objective Statement: 60 y/o male with PMhx of STEMI (July 2017), HTN presents to the ED complaining of chest pain x 8 days. Patient states that the pain is mild to the center and left side of his chest. States that the pain feels different than when he had the heart attack. States that he did not take anything at home for pain. Pain is not pleuritic, positional or reproducible. Patient states that he has been taking his blood pressure medication at home as directed. However he has not been taking aspirin as directed. Patient also reports that he has had left calf pain for past few days. Denies any trauma or injury. No recent illness, recent travel, fever, chills, SOB, cough, palpitations, abdominal pain, n/v/d. No smoking, drinking or drugs. Patient also admits to headache. 61 yr old male to ED with H/O STEMI July 2017 HTN. Pt c/o chest pain l sided nonrad x 8 days Pt Angolan speaking Dr Rodríguez speaks Spanishj. Pt awake alert and orientedx4 Resp even and nonlab. Pt denies cough, palpitations, abdominal pain, n/v/d Denies  smoking, drinking or drugs. Pt c/o L calf pain Has been taking ASA. Denies recent travel. Pain is different than when he had Stemi.

## 2021-11-15 NOTE — CONSULT NOTE ADULT - SUBJECTIVE AND OBJECTIVE BOX
Patient seen and evaluated at bedside    Chief Complaint: chest pain    HPI:  60 yo M with hx of HTN, HLD, AWSTEMI in 2017 s/p LAD PEARL with subsequent mild to mod LV systolic dysfunction and EF of 40-45% (seeing Dr. Savage) presented with chest pain of 4 weeks. He stated 8 days to the ED, however, upon re-questioning stated 3-4 weeks to me. Pain is L sided and constant, pt unsure when it started or whether anything makes it worse or better. Not exertional, not positional, not pleuritic, not reproducible. He is concerned that his high BP (ranging 150-170s) at home is related to his pain, however, his BP has never been less than 150s so he does not know whether pain would be better if his BP is better. He says the pain feels different than when he had his MI, pain is very mild.     Per chart review, pt has a hx of intermittently not taking his medications. He is off any antiplatelets at this time and is only taking lisinopril 10 mg. He said someone in his pharmacy told him to take 10 mg BID recently for high HTN so that's what he has been doing.     Per Dr. Savage's note from 9/2020, he wanted to pursue nuclear stress test given pt has some intermittent L finger numbness, but patient never got it done. In the ED today, EKG is unchanged from prior and troponin was 8 x1.     PMHx:   HTN (hypertension)  HLD (hyperlipidemia)    PSHx:   H/O inguinal hernia repair    Allergies:  No Known Allergies    Home Meds:    Current Medications:     FAMILY HISTORY:  No pertinent family history in first degree relatives    Social History:  Smoking History:  Alcohol Use:  Drug Use:    REVIEW OF SYSTEMS:  Constitutional:     [x ] negative [ ] fevers [ ] chills [ ] weight loss [ ] weight gain  HEENT:                  [x ] negative [ ] dry eyes [ ] eye irritation [ ] postnasal drip [ ] nasal congestion  CV:                         [ ] negative  [x ] chest pain [ ] orthopnea [ ] palpitations [ ] murmur  Resp:                     [x ] negative [ ] cough [ ] shortness of breath [ ] dyspnea [ ] wheezing [ ] sputum [ ]hemoptysis  GI:                          [ x] negative [ ] nausea [ ] vomiting [ ] diarrhea [ ] constipation [ ] abd pain [ ] dysphagia   :                        [ x] negative [ ] dysuria [ ] nocturia [ ] hematuria [ ] increased urinary frequency  Musculoskeletal: [x ] negative [ ] back pain [ ] myalgias [ ] arthralgias [ ] fracture  Skin:                       [ x] negative [ ] rash [ ] itch  Neurological:        [ x] negative [ ] headache [ ] dizziness [ ] syncope [ ] weakness [ ] numbness  Psychiatric:           [ x] negative [ ] anxiety [ ] depression  Endocrine:            [ x] negative [ ] diabetes [ ] thyroid problem  Heme/Lymph:      [ x] negative [ ] anemia [ ] bleeding problem  Allergic/Immune: [ x] negative [ ] itchy eyes [ ] nasal discharge [ ] hives [ ] angioedema    [ x] All other systems negative  [ ] Unable to assess ROS due to    Physical Exam:  T(F): 98.2 (11-15), Max: 98.2 (11-15)  HR: 78 (11-15) (75 - 78)  BP: 148/86 (11-15) (148/86 - 165/99)  RR: 18 (11-15)  SpO2: 97% (11-15)  GENERAL: No acute distress, well-developed  HEAD:  Atraumatic, Normocephalic  ENT: EOMI, PERRLA, conjunctiva and sclera clear, Neck supple, No JVD, moist mucosa  CHEST/LUNG: Clear to auscultation bilaterally; No wheeze, equal breath sounds bilaterally   BACK: No spinal tenderness  HEART: Regular rate and rhythm; No murmurs, rubs, or gallops  ABDOMEN: Soft, Nontender, Nondistended; Bowel sounds present  EXTREMITIES:  No clubbing, cyanosis, or edema  PSYCH: Nl behavior, nl affect  NEUROLOGY: AAOx3, non-focal, cranial nerves intact  SKIN: Normal color, No rashes or lesions  LINES:    Cardiovascular Diagnostic Testing:    ECG: Personally reviewed:    < from: Transthoracic Echocardiogram (05.04.18 @ 11:50) >  1. Severely dilated left atrium.  LA volume index = 59  cc/m2.  2. Left ventricular enlargement.  3. Mild to moderate segmental left ventricular systolic  dysfunction. Severe hypokinesis of the mid to apical  anteroseptum and anterior wall, apex.  *** Compared with echocardiogram of 7/9/2017, no  significant changes noted.    < end of copied text >    < from: Cardiac Cath Lab - Adult (07.06.17 @ 20:51) >  CORONARY VESSELS: The coronary circulation is right dominant.  LM:   --  LM: Angiography showed minor luminal irregularities with no flow  limiting lesions.  LAD:   --  Proximal LAD: There was a 100 % stenosis.  CX:   --  Circumflex: Angiography showed minor luminal irregularities with  no flow limiting lesions.  --  Mid circumflex: There was a 50 % stenosis.  RCA:   --  RCA: Angiography showed minor luminal irregularities with no  flow limiting lesions.  COMPLICATIONS: There were no complications.    < end of copied text >    Imaging:    CXR: Personally reviewed    Labs: Personally reviewed                        14.2   8.41  )-----------( 245      ( 15 Nov 2021 15:56 )             41.8     11-15    137  |  101  |  13  ----------------------------<  100<H>  3.9   |  25  |  0.91    Ca    9.7      15 Nov 2021 15:56  Mg     2.2     11-15    TPro  7.0  /  Alb  4.4  /  TBili  0.5  /  DBili  x   /  AST  18  /  ALT  15  /  AlkPhos  71  11-15      Serum Pro-Brain Natriuretic Peptide: 829 pg/mL (11-15 @ 15:56)

## 2021-11-15 NOTE — ED PROVIDER NOTE - OBJECTIVE STATEMENT
62 y/o male with PMhx of STEMI (July 2017), HTN presents to the ED complaining of chest pain x 8 days. Patient states that the pain is mild to the center and left side of his chest. States that the pain feels different than when he had the heart attack. States that he did not take anything at home for pain. Pain is not pleuritic, positional or reproducible. Patient states that he has been taking his blood pressure medication at home as directed. However he has not been taking aspirin as directed. Patient also reports that he has had left calf pain for past few days. Denies any trauma or injury. No recent illness, recent travel, fever, chills, SOB, cough, palpitations, abdominal pain, n/v/d. No smoking, drinking or drugs. Patient also admits to headache.

## 2021-11-15 NOTE — ED PROVIDER NOTE - NSFOLLOWUPINSTRUCTIONS_ED_ALL_ED_FT
1. Follow up with your PCP within 2-3 days  2. Follow up with cardiology within 2-3 days  3. Continue taking all home medications as directed   4. Return to the emergency department if you develop worsening chest pain, difficulty breathing, palpitations, passing out, weakness, numbness, fevers, chills or any other concerning symptoms.

## 2021-11-15 NOTE — ED PROVIDER NOTE - ATTENDING CONTRIBUTION TO CARE
60 y/o m with pmhx STEMI, HTN, presents for chest pain x 8 days. Patient states this chest pain feels different than his chest pain in the past. no heart palp. also has a mild headache with pain. he has been taking more lisinopril for his pressure which improved but the chest pain did not resolve. also has not bee taking any other medication including his ASA. no trauma. no weakness or numbness.   Gen.  no acute distress, well appearing male  HEENT:   perrl eomi  Lungs:  b/l bs  CVS: S1S2   Abd;  soft no guarding no distention no ttp  Ext:  no pitting edema or erythema  Neuro: aaox3 no focal deficits  MSK: strength 5/5 b/l upper and lower ext.

## 2021-11-15 NOTE — ED PROVIDER NOTE - REFUSAL OF SERVICE, MDM
Patient is A&O x3, not intoxicated and is capable of making his own medical decisions.  Lyle (was used to communicate with patient that the risks of leaving against medical advice include heart attack and death. Patient verbalized understanding and agrees with leaving AMA. Monica Couch PA-C

## 2021-11-15 NOTE — ED PROVIDER NOTE - PROGRESS NOTE DETAILS
ECHO tech at bedside. Patient states that he no longer would like to stay in the hospital because "he has had everything he needs already". I explained to patient that he will not receive all test results and further evaluation if does not want to stay in the hospital. Patient states that he will follow up with his own cardiologist and he will go over all test results with his own cardiologist.     : Lyle [id number] Notified by hospitalist who evaluated the patient that patient no longer wants to stay in the hospital for admission despite cardiology recommendations. I went to evaluate patient and he states that he no longer would like to stay in the hospital because "he has had everything he needs already". I explained to patient that he will not receive all test results and further evaluation if does not want to stay in the hospital. Patient states that he will follow up with his own cardiologist and he will go over all test results with his own cardiologist. I explained to patient that if he decides to leave the hospital and refuses the admission that this will be against medical advice. I explained to patient that the risk of leaving against medical advice includes cardiac arrest, serious illness and death. Patient verbalized understanding and continues to state that he would like to leave against medical advice. I will cancel the admission to the hospital and discharge patient AMA. ED attending aware and agrees with plan for d/c AMA. Monica Couch PA-C     : Lyle [phone]

## 2021-11-15 NOTE — ED PROVIDER NOTE - CLINICAL SUMMARY MEDICAL DECISION MAKING FREE TEXT BOX
ATTG: : chest pain with sig pmhx / riak factors, will check cardiac work up, xray, labs and re eval for dispo

## 2021-11-15 NOTE — CONSULT NOTE ADULT - ASSESSMENT
60 yo M with hx of AWSTEMI presented with chest pain x4 weeks, found to have non-ischemic EKG and negative troponin.    #atypical chest pain  - nature of pain is atypical given it's been ongoing for 4 weeks constantly and now with negative troponin, however, given pt's hx of MI and not currently taking any meds, would pursue ischemic work up  - cont to trend trops  - place on telemetry  - get TTE in the am  - would start on baby asa daily  - start toprol 25 daily  - start atorvastatin 80 daily  - can switch lisinopril 10 BID to losartan 50 daily to anticipate starting Entresto  - get nuclear stress test in the am    #HTN  - management as above  - will up-titrate BB and losartan prn 62 yo M with hx of AWSTEMI presented with chest pain x4 weeks, found to have non-ischemic EKG and negative troponin.    #atypical chest pain  - nature of pain is atypical given it's been ongoing for 4 weeks constantly and now with negative troponin, however, given pt's hx of MI and not currently taking any meds, would pursue ischemic work up  - cont to trend trops  - place on telemetry  - get TTE in the am  - would start on baby asa daily  - start toprol 25 daily  - start atorvastatin 80 daily  - can switch lisinopril 10 BID to losartan 50 daily to anticipate starting Entresto  - get nuclear stress test in the am  - also agree with LE dopplers given his LE pain, lower suspicion for PE tho given non-pleuritic nature of CP along with no HD changes    #HTN  - management as above  - will up-titrate BB and losartan prn

## 2021-11-15 NOTE — ED PROVIDER NOTE - PHYSICAL EXAMINATION
CONSTITUTIONAL: Patient is awake, alert and oriented x 3. Patient is well appearing and in no acute distress.  HEAD: NCAT  NECK: Supple,   LUNGS: CTA B/L, no wheezes, rhonci or rales  HEART: RRR.+S1S2 no murmurs,   ABDOMEN: Soft, non-tender to palpation throughout all four quadrants,    EXTREMITY: No edema b/l le: (+) calf tenderness left LE: FROM upper and lower ext b/l,   SKIN: No rash or lesions  NEURO:  No focal deficits

## 2021-11-15 NOTE — ED ADULT NURSE REASSESSMENT NOTE - NS ED NURSE REASSESS COMMENT FT1
Patient handoff received from SADIA Mackay. Patient is AOx3, NAD at this time. safety maintained, pt to AMA.

## 2021-11-15 NOTE — ED PROVIDER NOTE - PATIENT PORTAL LINK FT
You can access the FollowMyHealth Patient Portal offered by [facility] by registering at the following website: [email or web address]. By joining [facility]’s FollowMyHealth portal, you will also be able to view your health information using other applications (apps) compatible with our system.

## 2021-11-18 ENCOUNTER — OUTPATIENT (OUTPATIENT)
Dept: OUTPATIENT SERVICES | Facility: HOSPITAL | Age: 61
LOS: 1 days | End: 2021-11-18
Payer: SELF-PAY

## 2021-11-18 ENCOUNTER — APPOINTMENT (OUTPATIENT)
Dept: CARDIOLOGY | Facility: HOSPITAL | Age: 61
End: 2021-11-18

## 2021-11-18 VITALS
WEIGHT: 170 LBS | DIASTOLIC BLOOD PRESSURE: 92 MMHG | OXYGEN SATURATION: 97 % | HEART RATE: 80 BPM | BODY MASS INDEX: 27.32 KG/M2 | HEIGHT: 66 IN | SYSTOLIC BLOOD PRESSURE: 140 MMHG

## 2021-11-18 DIAGNOSIS — Z98.890 OTHER SPECIFIED POSTPROCEDURAL STATES: Chronic | ICD-10-CM

## 2021-11-18 DIAGNOSIS — I25.10 ATHEROSCLEROTIC HEART DISEASE OF NATIVE CORONARY ARTERY WITHOUT ANGINA PECTORIS: ICD-10-CM

## 2021-11-18 PROCEDURE — 93005 ELECTROCARDIOGRAM TRACING: CPT

## 2021-11-18 PROCEDURE — G0463: CPT

## 2021-11-18 RX ORDER — LISINOPRIL 10 MG/1
10 TABLET ORAL DAILY
Qty: 30 | Refills: 0 | Status: DISCONTINUED | COMMUNITY
Start: 2017-07-18 | End: 2021-11-18

## 2021-11-18 RX ORDER — METOPROLOL SUCCINATE 25 MG/1
25 TABLET, EXTENDED RELEASE ORAL
Qty: 90 | Refills: 3 | Status: DISCONTINUED | COMMUNITY
Start: 2017-08-01 | End: 2021-11-18

## 2021-11-18 RX ORDER — ASPIRIN 81 MG/1
81 TABLET, COATED ORAL
Qty: 90 | Refills: 3 | Status: DISCONTINUED | COMMUNITY
Start: 2017-07-18 | End: 2021-11-18

## 2021-11-18 RX ORDER — ATORVASTATIN CALCIUM 80 MG/1
80 TABLET, FILM COATED ORAL
Qty: 90 | Refills: 3 | Status: DISCONTINUED | COMMUNITY
Start: 2017-07-18 | End: 2021-11-18

## 2021-11-19 DIAGNOSIS — I25.119 ATHEROSCLEROTIC HEART DISEASE OF NATIVE CORONARY ARTERY WITH UNSPECIFIED ANGINA PECTORIS: ICD-10-CM

## 2021-11-19 DIAGNOSIS — Z00.00 ENCOUNTER FOR GENERAL ADULT MEDICAL EXAMINATION WITHOUT ABNORMAL FINDINGS: ICD-10-CM

## 2021-11-19 DIAGNOSIS — I10 ESSENTIAL (PRIMARY) HYPERTENSION: ICD-10-CM

## 2021-11-20 NOTE — PHYSICAL EXAM

## 2021-11-20 NOTE — HISTORY OF PRESENT ILLNESS
[FreeTextEntry1] : 61M with pmhx of HTN, HLD, anterior wall STEMI on 7/6/2017 w/ a drug-eluting stent inserted on a 100% lesion in the proximal LAD w/ subsequent mild to moderate LV systolic dysfunction and EF of 40-45%. Pt also had a mid circumflex 50 % stenosis which was not intervened on. \par \par Last seen by me 10/2019, recommended nuclear stress test but he has not followed up. He went to the ED with chest pain 11/15, evaluated by cardiology fellow at the time, troponins negative x 1, recommended stress test and echo. Of note, he was only taking lisinopril 10mg daily without beta blockers, aspirin, or statin. TTE was done which showed deterioration of his EF to approximately 30-35%. He was told to increase his Lisinopril to 10mg BID which he has been taking. He states that he is currently chest pain free but has had intermittent chest pain with exertion for the past 2-3 weeks with associated hypertension at home (as high as systolics in the 170s)\par \par Labs 11/15/21:\par CBC normal, CMP normal, \par TTE 11/15: Left Ventricle: Endocardial visualization enhanced with\par intravenous injection of Ultrasonic Enhancing Agent\par (Definity). Severe segmental left ventricular systolic\par dysfunction. No left ventricular thrombus. The mid anterior\par septum, the apical inferior wall, the apical anterior wall,\par the apical septum, the apical lateral wall, the basal\par anterior septum, the basal inferior wall, the mid anterior\par wall, the mid inferior wall, the mid inferoseptum, and the\par apical cap are hypokinetic.  Eccentric left ventricular\par hypertrophy (dilated left ventricle with normal relative\par wall thickness). Reversal of the E-A  waves of the mitral\par inflow pattern is consistent with diastolic LV dysfunction.\par Right Heart: Normal right atrium. Normal right ventricular\par size and function. Normal tricuspid valve. Minimal\par tricuspid regurgitation. Normal pulmonic valve. Minimal\par pulmonic regurgitation.\par Pericardium/Pleura: Normal pericardium with no pericardial\par effusion.\par Hemodynamic: Estimated right atrial pressure is 8 mm Hg.\par Estimated right ventricular systolic pressure equals 24 mm\par Hg, assuming right atrial pressure equals 8 mm Hg,\par consistent with normal pulmonary pressures.\par ------------------------------------------------------------------------\par Conclusions:\par 1. Tethered mitral valve leaflets with normal opening.\par 2. Thickened trileaflet aortic valve. Mild aortic\par regurgitation.\par 3. Endocardial visualization enhanced with intravenous\par injection of Ultrasonic Enhancing Agent (Definity). Severe\par segmental left ventricular systolic dysfunction. No left\par ventricular thrombus.\par 4. Reversal of the E-A  waves of the mitral inflow pattern\par is consistent with diastolic LV dysfunction.\par 5. Normal right ventricular size and function.\par *** Compared with echocardiogram of 5/4/2018, LV function\par has deteriorated.\par ------------------------------------------------------------------------\par Confirmed on  11/15/2021 - 20:42:25 by Jarvis Armstrong M.D.\par \par [facility] 7/2017: CORONARY VESSELS: The coronary circulation is right dominant.\par LM:   --  LM: Angiography showed minor luminal irregularities with no flow\par limiting lesions.\par LAD:   --  Proximal LAD: There was a 100 % stenosis.\par CX:   --  Circumflex: Angiography showed minor luminal irregularities with\par no flow limiting lesions.\par --  Mid circumflex: There was a 50 % stenosis.\par RCA:   --  RCA: Angiography showed minor luminal irregularities with no\par flow limiting lesions.\par COMPLICATIONS: There were no complications.\par SUMMARY:\par CORONARY VESSELS: LM: Angiography showed minor luminal irregularities with\par no flow limiting lesions. Proximal LAD: There was a 100 % stenosis.\par Circumflex: Angiography showed minor luminal irregularities with no flow\par limitinglesions. Mid circumflex: There was a 50 % stenosis. RCA:\par Angiography showed minor luminal irregularities with no flow limiting\par lesions.\par CARDIAC STRUCTURES: Analysis of regional contractile function demonstrated\par severe anterolateral hypokinesis and severe apical hypokinesis. EF\par estimated was 35 %.\par 1ST LESION INTERVENTIONS: A drug-eluting stent was performed on the 100 %\par lesion in the proximal LAD. Following intervention there was an excellent\par angiographic appearance with a 1 % residual stenosis.\par DIAGNOSTIC RECOMMENDATIONS: PCI of proximal LAD for AWMI\par

## 2021-11-20 NOTE — ASSESSMENT
[FreeTextEntry1] : 62 y/o M with history of HTN, HLD, anterior wall STEMI on 7/6/2017 w/ a drug-eluting stent inserted on a 100% lesion in the proximal LAD. Pt also had a Mid circumflex 50% stenosis which was not intervened on. He has been non-compliant with medications and was lost to f/u. His recent presentation to ED revealed a drop in his EF to 30-35% in setting of chest pain, but troponin was neg x 1 and he ultimately AMA'ed without further evaluation. Plan to restart all previous medications and obtain nuclear stress test to r/o ischemia.\par \par #AWMI\par #ischemic cardiomyopathy\par - TTE 11/2021 with severe segmental LV systolic dysfunction (EF 30-35%)\par - restart ASA 81 daily, atorvastatin 80 PO daily, Lisinopril 20mg daily, and Toprol 50mg XL daily\par - obtain nuclear stress test to r/o ischemia, if pt is ischemic, will consider repeat LHC. If no e/o ischemia, will continue to medically optimize\par \par #HTN - Stressed importance of compliance with medications. \par - c/w Lisinopril 20mg daily as above\par \par RTC in 2 weeks

## 2021-12-02 ENCOUNTER — OUTPATIENT (OUTPATIENT)
Dept: OUTPATIENT SERVICES | Facility: HOSPITAL | Age: 61
LOS: 1 days | End: 2021-12-02
Payer: SELF-PAY

## 2021-12-02 ENCOUNTER — NON-APPOINTMENT (OUTPATIENT)
Age: 61
End: 2021-12-02

## 2021-12-02 ENCOUNTER — APPOINTMENT (OUTPATIENT)
Dept: CARDIOLOGY | Facility: HOSPITAL | Age: 61
End: 2021-12-02

## 2021-12-02 VITALS
BODY MASS INDEX: 27.32 KG/M2 | HEIGHT: 66 IN | SYSTOLIC BLOOD PRESSURE: 124 MMHG | HEART RATE: 79 BPM | OXYGEN SATURATION: 96 % | WEIGHT: 170 LBS | DIASTOLIC BLOOD PRESSURE: 81 MMHG

## 2021-12-02 DIAGNOSIS — Z98.890 OTHER SPECIFIED POSTPROCEDURAL STATES: Chronic | ICD-10-CM

## 2021-12-02 PROCEDURE — G0463: CPT

## 2021-12-02 PROCEDURE — 93005 ELECTROCARDIOGRAM TRACING: CPT

## 2021-12-02 NOTE — HISTORY OF PRESENT ILLNESS
[FreeTextEntry1] : 61M with pmhx of HTN, HLD, anterior wall STEMI on 7/6/2017 w/ a drug-eluting stent inserted on a 100% lesion in the proximal LAD w/ subsequent mild to moderate LV systolic dysfunction and EF of 40-45%. Pt also had a mid circumflex 50 % stenosis which was not intervened on. \par \par He went to the ED with chest pain 11/15, evaluated by cardiology fellow at the time, troponins negative x 1, recommended stress test and echo. Of note, he was only taking lisinopril 10mg daily without beta blockers, aspirin, or statin. TTE was done which showed deterioration of his EF to approximately 30-35%. He AMA'ed prior to stress testing. He was told to increase his Lisinopril to 10mg BID which he has been taking. Seen by me 2 weeks prior, states that he was chest pain free but has had intermittent chest pain with exertion for the past 2-3 weeks with associated hypertension at home (as high as systolics in the 170s). \par \par Today, he presents for f/u. Blood pressure is improving with re-initiation of lisinopril 20mg daily. He has not restarted metoprolol yet. He currently has no chest pain. Occasional mild chest discomfort with exertion.\par \par Labs 11/15/21:\par CBC normal, CMP normal, \par TTE 11/15: Left Ventricle: Endocardial visualization enhanced with\par intravenous injection of Ultrasonic Enhancing Agent\par (Definity). Severe segmental left ventricular systolic\par dysfunction. No left ventricular thrombus. The mid anterior\par septum, the apical inferior wall, the apical anterior wall,\par the apical septum, the apical lateral wall, the basal\par anterior septum, the basal inferior wall, the mid anterior\par wall, the mid inferior wall, the mid inferoseptum, and the\par apical cap are hypokinetic. Eccentric left ventricular\par hypertrophy (dilated left ventricle with normal relative\par wall thickness). Reversal of the E-A waves of the mitral\par inflow pattern is consistent with diastolic LV dysfunction.\par Right Heart: Normal right atrium. Normal right ventricular\par size and function. Normal tricuspid valve. Minimal\par tricuspid regurgitation. Normal pulmonic valve. Minimal\par pulmonic regurgitation.\par Pericardium/Pleura: Normal pericardium with no pericardial\par effusion.\par Hemodynamic: Estimated right atrial pressure is 8 mm Hg.\par Estimated right ventricular systolic pressure equals 24 mm\par Hg, assuming right atrial pressure equals 8 mm Hg,\par consistent with normal pulmonary pressures.\par ------------------------------------------------------------------------\par Conclusions:\par 1. Tethered mitral valve leaflets with normal opening.\par 2. Thickened trileaflet aortic valve. Mild aortic\par regurgitation.\par 3. Endocardial visualization enhanced with intravenous\par injection of Ultrasonic Enhancing Agent (Definity). Severe\par segmental left ventricular systolic dysfunction. No left\par ventricular thrombus.\par 4. Reversal of the E-A waves of the mitral inflow pattern\par is consistent with diastolic LV dysfunction.\par 5. Normal right ventricular size and function.\par *** Compared with echocardiogram of 5/4/2018, LV function\par has deteriorated.\par ------------------------------------------------------------------------\par Confirmed on 11/15/2021 - 20:42:25 by Jarvis Armstrong M.D.\par \par [facility] 7/2017: CORONARY VESSELS: The coronary circulation is right dominant.\par LM: -- LM: Angiography showed minor luminal irregularities with no flow\par limiting lesions.\par LAD: -- Proximal LAD: There was a 100 % stenosis.\par CX: -- Circumflex: Angiography showed minor luminal irregularities with\par no flow limiting lesions.\par -- Mid circumflex: There was a 50 % stenosis.\par RCA: -- RCA: Angiography showed minor luminal irregularities with no\par flow limiting lesions.\par COMPLICATIONS: There were no complications.\par SUMMARY:\par CORONARY VESSELS: LM: Angiography showed minor luminal irregularities with\par no flow limiting lesions. Proximal LAD: There was a 100 % stenosis.\par Circumflex: Angiography showed minor luminal irregularities with no flow\par limitinglesions. Mid circumflex: There was a 50 % stenosis. RCA:\par Angiography showed minor luminal irregularities with no flow limiting\par lesions.\par CARDIAC STRUCTURES: Analysis of regional contractile function demonstrated\par severe anterolateral hypokinesis and severe apical hypokinesis. EF\par estimated was 35 %.\par 1ST LESION INTERVENTIONS: A drug-eluting stent was performed on the 100 %\par lesion in the proximal LAD. Following intervention there was an excellent\par angiographic appearance with a 1 % residual stenosis.\par DIAGNOSTIC RECOMMENDATIONS: PCI of proximal LAD for AWMI

## 2021-12-02 NOTE — PHYSICAL EXAM

## 2021-12-02 NOTE — ASSESSMENT
[FreeTextEntry1] : 60 y/o M with history of HTN, HLD, anterior wall STEMI on 7/6/2017 w/ a drug-eluting stent inserted on a 100% lesion in the proximal LAD. Pt also had a Mid circumflex 50% stenosis which was not intervened on. He has been non-compliant with medications and was lost to f/u. His recent presentation to ED revealed a drop in his EF to 30-35% in setting of chest pain, but troponin was neg x 1 and he ultimately AMA'ed without further evaluation. Plan to restart all previous medications and obtain nuclear stress test to r/o ischemia.\par \par #AWMI\par #ischemic cardiomyopathy\par - TTE 11/2021 with severe segmental LV systolic dysfunction (EF 30-35%)\par - continue ASA 81 daily, atorvastatin 80 PO daily, Lisinopril 20mg daily\par - restart Toprol 50mg XL daily\par - will continue to uptitrate above medications, but given history of non-compliance, will uptitrate slowly (aldactone next likely)\par - expedited nuclear stress test to r/o ischemia, if pt is ischemic, will consider repeat LHC. If no e/o ischemia, will continue to medically optimize\par \par #HTN - Stressed importance of compliance with medications. \par - c/w Lisinopril 20mg daily as above

## 2021-12-06 DIAGNOSIS — I10 ESSENTIAL (PRIMARY) HYPERTENSION: ICD-10-CM

## 2021-12-06 DIAGNOSIS — Z00.00 ENCOUNTER FOR GENERAL ADULT MEDICAL EXAMINATION WITHOUT ABNORMAL FINDINGS: ICD-10-CM

## 2021-12-06 DIAGNOSIS — R07.9 CHEST PAIN, UNSPECIFIED: ICD-10-CM

## 2021-12-06 DIAGNOSIS — I25.119 ATHEROSCLEROTIC HEART DISEASE OF NATIVE CORONARY ARTERY WITH UNSPECIFIED ANGINA PECTORIS: ICD-10-CM

## 2021-12-09 ENCOUNTER — OUTPATIENT (OUTPATIENT)
Dept: OUTPATIENT SERVICES | Facility: HOSPITAL | Age: 61
LOS: 1 days | End: 2021-12-09
Payer: SELF-PAY

## 2021-12-09 ENCOUNTER — APPOINTMENT (OUTPATIENT)
Dept: CV DIAGNOSTICS | Facility: HOSPITAL | Age: 61
End: 2021-12-09

## 2021-12-09 DIAGNOSIS — Z98.890 OTHER SPECIFIED POSTPROCEDURAL STATES: Chronic | ICD-10-CM

## 2021-12-09 DIAGNOSIS — I21.3 ST ELEVATION (STEMI) MYOCARDIAL INFARCTION OF UNSPECIFIED SITE: ICD-10-CM

## 2021-12-09 PROCEDURE — 93018 CV STRESS TEST I&R ONLY: CPT

## 2021-12-09 PROCEDURE — 78452 HT MUSCLE IMAGE SPECT MULT: CPT | Mod: MH

## 2021-12-09 PROCEDURE — 93016 CV STRESS TEST SUPVJ ONLY: CPT

## 2021-12-09 PROCEDURE — 93017 CV STRESS TEST TRACING ONLY: CPT

## 2021-12-09 PROCEDURE — 78452 HT MUSCLE IMAGE SPECT MULT: CPT | Mod: 26,MH

## 2021-12-09 PROCEDURE — A9500: CPT

## 2022-01-13 ENCOUNTER — OUTPATIENT (OUTPATIENT)
Dept: OUTPATIENT SERVICES | Facility: HOSPITAL | Age: 62
LOS: 1 days | End: 2022-01-13
Payer: SELF-PAY

## 2022-01-13 ENCOUNTER — NON-APPOINTMENT (OUTPATIENT)
Age: 62
End: 2022-01-13

## 2022-01-13 ENCOUNTER — APPOINTMENT (OUTPATIENT)
Dept: CARDIOLOGY | Facility: HOSPITAL | Age: 62
End: 2022-01-13

## 2022-01-13 VITALS
BODY MASS INDEX: 27.32 KG/M2 | SYSTOLIC BLOOD PRESSURE: 125 MMHG | HEART RATE: 87 BPM | HEIGHT: 66 IN | OXYGEN SATURATION: 98 % | DIASTOLIC BLOOD PRESSURE: 78 MMHG | WEIGHT: 170 LBS

## 2022-01-13 DIAGNOSIS — Z98.890 OTHER SPECIFIED POSTPROCEDURAL STATES: Chronic | ICD-10-CM

## 2022-01-13 DIAGNOSIS — I25.10 ATHEROSCLEROTIC HEART DISEASE OF NATIVE CORONARY ARTERY WITHOUT ANGINA PECTORIS: ICD-10-CM

## 2022-01-13 PROCEDURE — 93005 ELECTROCARDIOGRAM TRACING: CPT

## 2022-01-13 PROCEDURE — G0463: CPT

## 2022-01-14 DIAGNOSIS — R07.9 CHEST PAIN, UNSPECIFIED: ICD-10-CM

## 2022-01-14 NOTE — ASSESSMENT
[FreeTextEntry1] : 60 y/o M with history of HTN, HLD, anterior wall STEMI on 7/6/2017 w/ PEARL to 100% lesion in the proximal LAD. Pt also had a Mid circumflex 50% stenosis which was not intervened on. He has been non-compliant with medications and was lost to f/u. His recent presentation to ED revealed a drop in his EF to 30-35% in setting of chest pain, but troponin was neg x 1 and he ultimately AMA'ed without further evaluation. 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 Self, will proceed with coronary angiogram given ischemia on NST, chest pain, and drop in LVEF.\par \par #HTN - Stressed importance of compliance with medications. \par - c/w Lisinopril 20mg daily as above\par \par RTC after C

## 2022-01-14 NOTE — HISTORY OF PRESENT ILLNESS
[FreeTextEntry1] : 61M with pmhx of HTN, HLD, anterior wall STEMI on 7/6/2017 w/ a drug-eluting stent inserted on a 100% lesion in the proximal LAD w/ subsequent mild to moderate LV systolic dysfunction and EF of 40-45%. Pt also had a mid circumflex 50 % stenosis which was not intervened on. \par \par He went to the ED with chest pain 11/15, evaluated by cardiology fellow at the time, troponins negative x 1, recommended stress test and echo. Of note, he was only taking lisinopril 10mg daily without beta blockers, aspirin, or statin. TTE was done which showed deterioration of his EF to approximately 30-35%. He AMA'ed prior to stress testing. He was told to increase his Lisinopril to 10mg BID which he has been taking. He was finally able to obtain stress test as outpatient which showed large areas of villa-infarct ischemia in anterior, anteroseptal, anterolateral, and apical walls, as well as mild villa-infarct ischemia in the inferolateral, inferior/inferoseptal walls. \par \par Today he presents for f/u. Still with mild chest discomfort with exertion. He is taking medications more consistently now but did forget his baby aspirin. States his blood pressure at home before he takes medications is usually SBPs in 150s.\par \par Labs 11/15/21:\par CBC normal, CMP normal, \par TTE 11/15: Left Ventricle: Endocardial visualization enhanced with\par intravenous injection of Ultrasonic Enhancing Agent\par (Definity). Severe segmental left ventricular systolic\par dysfunction. No left ventricular thrombus. The mid anterior\par septum, the apical inferior wall, the apical anterior wall,\par the apical septum, the apical lateral wall, the basal\par anterior septum, the basal inferior wall, the mid anterior\par wall, the mid inferior wall, the mid inferoseptum, and the\par apical cap are hypokinetic. Eccentric left ventricular\par hypertrophy (dilated left ventricle with normal relative\par wall thickness). Reversal of the E-A waves of the mitral\par inflow pattern is consistent with diastolic LV dysfunction.\par Right Heart: Normal right atrium. Normal right ventricular\par size and function. Normal tricuspid valve. Minimal\par tricuspid regurgitation. Normal pulmonic valve. Minimal\par pulmonic regurgitation.\par Pericardium/Pleura: Normal pericardium with no pericardial\par effusion.\par Hemodynamic: Estimated right atrial pressure is 8 mm Hg.\par Estimated right ventricular systolic pressure equals 24 mm\par Hg, assuming right atrial pressure equals 8 mm Hg,\par consistent with normal pulmonary pressures.\par ------------------------------------------------------------------------\par Conclusions:\par 1. Tethered mitral valve leaflets with normal opening.\par 2. Thickened trileaflet aortic valve. Mild aortic\par regurgitation.\par 3. Endocardial visualization enhanced with intravenous\par injection of Ultrasonic Enhancing Agent (Definity). Severe\par segmental left ventricular systolic dysfunction. No left\par ventricular thrombus.\par 4. Reversal of the E-A waves of the mitral inflow pattern\par is consistent with diastolic LV dysfunction.\par 5. Normal right ventricular size and function.\par *** Compared with echocardiogram of 5/4/2018, LV function\par has deteriorated.\par ------------------------------------------------------------------------\par Confirmed on 11/15/2021 - 20:42:25 by Jarvis Armstrong M.D.\par \par [facility] 7/2017: CORONARY VESSELS: The coronary circulation is right dominant.\par LM: -- LM: Angiography showed minor luminal irregularities with no flow\par limiting lesions.\par LAD: -- Proximal LAD: There was a 100 % stenosis.\par CX: -- Circumflex: Angiography showed minor luminal irregularities with\par no flow limiting lesions.\par -- Mid circumflex: There was a 50 % stenosis.\par RCA: -- RCA: Angiography showed minor luminal irregularities with no\par flow limiting lesions.\par COMPLICATIONS: There were no complications.\par SUMMARY:\par CORONARY VESSELS: LM: Angiography showed minor luminal irregularities with\par no flow limiting lesions. Proximal LAD: There was a 100 % stenosis.\par Circumflex: Angiography showed minor luminal irregularities with no flow\par limitinglesions. Mid circumflex: There was a 50 % stenosis. RCA:\par Angiography showed minor luminal irregularities with no flow limiting\par lesions.\par CARDIAC STRUCTURES: Analysis of regional contractile function demonstrated\par severe anterolateral hypokinesis and severe apical hypokinesis. EF\par estimated was 35 %.\par 1ST LESION INTERVENTIONS: A drug-eluting stent was performed on the 100 %\par lesion in the proximal LAD. Following intervention there was an excellent\par angiographic appearance with a 1 % residual stenosis.\par DIAGNOSTIC RECOMMENDATIONS: PCI of proximal LAD for AWMI

## 2022-01-21 ENCOUNTER — TRANSCRIPTION ENCOUNTER (OUTPATIENT)
Age: 62
End: 2022-01-21

## 2022-01-21 ENCOUNTER — INPATIENT (INPATIENT)
Facility: HOSPITAL | Age: 62
LOS: 0 days | Discharge: HOME | DRG: 251 | End: 2022-01-22
Attending: INTERNAL MEDICINE | Admitting: INTERNAL MEDICINE
Payer: MEDICAID

## 2022-01-21 VITALS
SYSTOLIC BLOOD PRESSURE: 166 MMHG | OXYGEN SATURATION: 97 % | HEART RATE: 77 BPM | DIASTOLIC BLOOD PRESSURE: 96 MMHG | RESPIRATION RATE: 16 BRPM | HEIGHT: 66.54 IN | WEIGHT: 141.1 LBS

## 2022-01-21 DIAGNOSIS — R94.39 ABNORMAL RESULT OF OTHER CARDIOVASCULAR FUNCTION STUDY: ICD-10-CM

## 2022-01-21 DIAGNOSIS — Z98.890 OTHER SPECIFIED POSTPROCEDURAL STATES: Chronic | ICD-10-CM

## 2022-01-21 LAB
ANION GAP SERPL CALC-SCNC: 12 MMOL/L — SIGNIFICANT CHANGE UP (ref 5–17)
BUN SERPL-MCNC: 15 MG/DL — SIGNIFICANT CHANGE UP (ref 7–23)
CALCIUM SERPL-MCNC: 9.4 MG/DL — SIGNIFICANT CHANGE UP (ref 8.4–10.5)
CHLORIDE SERPL-SCNC: 102 MMOL/L — SIGNIFICANT CHANGE UP (ref 96–108)
CO2 SERPL-SCNC: 24 MMOL/L — SIGNIFICANT CHANGE UP (ref 22–31)
CREAT SERPL-MCNC: 0.88 MG/DL — SIGNIFICANT CHANGE UP (ref 0.5–1.3)
GLUCOSE SERPL-MCNC: 101 MG/DL — HIGH (ref 70–99)
HCT VFR BLD CALC: 41.7 % — SIGNIFICANT CHANGE UP (ref 39–50)
HGB BLD-MCNC: 14.2 G/DL — SIGNIFICANT CHANGE UP (ref 13–17)
MAGNESIUM SERPL-MCNC: 2.2 MG/DL — SIGNIFICANT CHANGE UP (ref 1.6–2.6)
MCHC RBC-ENTMCNC: 29.3 PG — SIGNIFICANT CHANGE UP (ref 27–34)
MCHC RBC-ENTMCNC: 34.1 GM/DL — SIGNIFICANT CHANGE UP (ref 32–36)
MCV RBC AUTO: 86.2 FL — SIGNIFICANT CHANGE UP (ref 80–100)
NRBC # BLD: 0 /100 WBCS — SIGNIFICANT CHANGE UP (ref 0–0)
PLATELET # BLD AUTO: 287 K/UL — SIGNIFICANT CHANGE UP (ref 150–400)
POTASSIUM SERPL-MCNC: 4.3 MMOL/L — SIGNIFICANT CHANGE UP (ref 3.5–5.3)
POTASSIUM SERPL-SCNC: 4.3 MMOL/L — SIGNIFICANT CHANGE UP (ref 3.5–5.3)
RBC # BLD: 4.84 M/UL — SIGNIFICANT CHANGE UP (ref 4.2–5.8)
RBC # FLD: 12 % — SIGNIFICANT CHANGE UP (ref 10.3–14.5)
SODIUM SERPL-SCNC: 138 MMOL/L — SIGNIFICANT CHANGE UP (ref 135–145)
WBC # BLD: 9.24 K/UL — SIGNIFICANT CHANGE UP (ref 3.8–10.5)
WBC # FLD AUTO: 9.24 K/UL — SIGNIFICANT CHANGE UP (ref 3.8–10.5)

## 2022-01-21 PROCEDURE — 93010 ELECTROCARDIOGRAM REPORT: CPT

## 2022-01-21 PROCEDURE — 93571 IV DOP VEL&/PRESS C FLO 1ST: CPT | Mod: 26,LC

## 2022-01-21 PROCEDURE — 99152 MOD SED SAME PHYS/QHP 5/>YRS: CPT

## 2022-01-21 PROCEDURE — 92920 PRQ TRLUML C ANGIOP 1ART&/BR: CPT | Mod: LD

## 2022-01-21 PROCEDURE — 93458 L HRT ARTERY/VENTRICLE ANGIO: CPT | Mod: 26,59

## 2022-01-21 RX ORDER — METOPROLOL TARTRATE 50 MG
100 TABLET ORAL DAILY
Refills: 0 | Status: DISCONTINUED | OUTPATIENT
Start: 2022-01-21 | End: 2022-01-22

## 2022-01-21 RX ORDER — LISINOPRIL 2.5 MG/1
1 TABLET ORAL
Qty: 0 | Refills: 0 | DISCHARGE

## 2022-01-21 RX ORDER — CLOPIDOGREL BISULFATE 75 MG/1
75 TABLET, FILM COATED ORAL DAILY
Refills: 0 | Status: DISCONTINUED | OUTPATIENT
Start: 2022-01-22 | End: 2022-01-22

## 2022-01-21 RX ORDER — LISINOPRIL 2.5 MG/1
20 TABLET ORAL DAILY
Refills: 0 | Status: DISCONTINUED | OUTPATIENT
Start: 2022-01-21 | End: 2022-01-22

## 2022-01-21 RX ORDER — ATORVASTATIN CALCIUM 80 MG/1
80 TABLET, FILM COATED ORAL AT BEDTIME
Refills: 0 | Status: DISCONTINUED | OUTPATIENT
Start: 2022-01-21 | End: 2022-01-22

## 2022-01-21 RX ORDER — METOPROLOL TARTRATE 50 MG
1 TABLET ORAL
Qty: 0 | Refills: 0 | DISCHARGE

## 2022-01-21 RX ORDER — ASPIRIN/CALCIUM CARB/MAGNESIUM 324 MG
81 TABLET ORAL DAILY
Refills: 0 | Status: DISCONTINUED | OUTPATIENT
Start: 2022-01-21 | End: 2022-01-22

## 2022-01-21 RX ADMIN — ATORVASTATIN CALCIUM 80 MILLIGRAM(S): 80 TABLET, FILM COATED ORAL at 21:51

## 2022-01-21 NOTE — DISCHARGE NOTE PROVIDER - CARE PROVIDER_API CALL
Clyde Self (MD)  Cardiovascular Disease; Interventional Cardiology  [address] [place] [address]  Phone: [phone]  Fax: [phone]  Follow Up Time: 2 weeks

## 2022-01-21 NOTE — DISCHARGE NOTE PROVIDER - NSDCCPTREATMENT_GEN_ALL_CORE_FT
PRINCIPAL PROCEDURE  Procedure: Coronary balloon angioplasty  Findings and Treatment: You had balloon angioplasty to your ostial diagnonal 90%       PRINCIPAL PROCEDURE  Procedure: Coronary balloon angioplasty  Findings and Treatment: You had balloon angioplasty to your ostial diagnonal 90%> Your stent was patent

## 2022-01-21 NOTE — DISCHARGE NOTE PROVIDER - NSFOLLOWUPCLINICS_GEN_ALL_ED_FT
[facility]  Cardiology  [address] [place] [id number]  Phone: [phone]  Fax:   Follow Up Time: 2 weeks

## 2022-01-21 NOTE — ASU PATIENT PROFILE, ADULT - FALL HARM RISK - UNIVERSAL INTERVENTIONS
Bed in lowest position, wheels locked, appropriate side rails in place/Call bell, personal items and telephone in reach/Instruct patient to call for assistance before getting out of bed or chair/Non-slip footwear when patient is out of bed/[place] to call system/Physically safe environment - no spills, clutter or unnecessary equipment/Purposeful Proactive Rounding/Room/bathroom lighting operational, light cord in reach

## 2022-01-21 NOTE — DISCHARGE NOTE PROVIDER - NSDCCPCAREPLAN_GEN_ALL_CORE_FT
PRINCIPAL DISCHARGE DIAGNOSIS  Diagnosis: Coronary artery disease, occlusive  Assessment and Plan of Treatment: Continue aspirin and Plavix for at least three months. Do not stop unless instructed by your physician.  Continue low salt, fat, cholesterol and carbohydrate diet. Follow up with cardiologist in the next 2 weeks      SECONDARY DISCHARGE DIAGNOSES  Diagnosis: Hypertension  Assessment and Plan of Treatment: Low sodium and fat diet, continue anti-hypertensive medications, and follow up with primary care physician.    Diagnosis: Hyperlipidemia  Assessment and Plan of Treatment: Low fat diet, exercise daily and continue current medications. Follow up with primary care physician and cardiologist for management.

## 2022-01-21 NOTE — DISCHARGE NOTE PROVIDER - NSDCFUADDINST_GEN_ALL_CORE_FT
Wound Care:   the day AFTER your procedure remove bandage GENTLY, and clean using  mild soap and gentle warm, water stream, pat dry. leave OPEN to air. YOU MAY SHOWER   DO NOT apply lotions, creams, ointments, powder, perfumes to your incision site  DO NOT SOAK your site for 1 week ( no baths, no pools, no tubs, etc...)  Check  your groin and /or wrist daily. A small amount of bruising, and soreness are normal    ACTIVITY: for 24 hours   - DO NOT DRIVE  - DO NOT make any important decisions or sign legal documents   - DO NOT operate heavy machineries   - you may resume sexual activity in 48 hours, unless otherwise instructed by your cardiologist     If your procedure was done through the WRIST: for the NEXT 3DAYS:  - avoid pushing, pulling, with that affected wrist   - avoid repeated movement of that hand and wrist ( eg: typing, hammering)  - DO NOT LIFT anything more than 5 lbs     MEDICATION:   take your medications as explained (see discharge paperwork)   If you received a STENT, you will be taking antiplatelet medications to KEEP YOUR STENT OPEN (eg: Aspirin, Plavix, Brilinta, Effient, etc).  Take as prescribed DO NOT STOP taking them without consulting with your cardiologist first.     Follow heart healthy diet recommended by your doctor,  if you smoke STOP SMOKING (may call [phone] for center of tobacco control if you need assistance)     CALL your doctor to make appointment in 2 WEEKS     ***CALL YOUR DOCTOR***  if you experience: fever, chills, body aches, or severe pain, swelling, redness, heat or yellow discharge at incision site  If you experience Bleeding or excruciating pain at the procedural site, swelling (golf ball size) at your procedural site  If you experience CHEST PAIN  If you experience extremity numbness, tingling, temperature change (of your procedural site)   If you are unable to reach your doctor, you may contact:   -Cardiology Office at [facility] at [phone] or   - [facility] [phone]  - [facility] [phone]

## 2022-01-21 NOTE — DISCHARGE NOTE PROVIDER - NSDCPNSUBOBJ_GEN_ALL_CORE
61 years old  male with PMHx of HTN, HLD, anterior wall STEMI on 7/6/2017 with PEARL to 100% occlusion lesion in the proximal LAD with subsequent mild to moderate LV systolic dysfunction and EF of 40-45%.  Pt had a mid Cx 50% stenosis which was not intervened on.  Patient has been non complaint with his medications and was lost to f/u.  His recent presentation to ED with CP on 11/15 revealed a drop in his EF to 30-35%, but troponin was negative x1 and he ultimately AMA'd without further evaluation.  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  Pt was evaluated on 1/13 at cardiology clinic and referred for [facility] for further ischemic evaluation.  No implantable cardiac monitoring device. Reports mild chest discomfort with exertion. Here for cardiac cath     Subjective/Observations: Pt had no events overnight. No chest pain, no palpitations, no shortness of breath, no dizziness, no pain at site. Site was stable. No evidence of hematoma       REVIEW OF SYSTEMS: All other review of systems is negative unless indicated above    PAST MEDICAL & SURGICAL HISTORY:  HTN (hypertension)    HLD (hyperlipidemia)    History of acute anterior wall MI  s/p prox LAD DESx1 on 7/6/17    H/O inguinal hernia repair        MEDICATIONS  (STANDING):  aspirin  chewable 81 milliGRAM(s) Oral daily  atorvastatin 80 milliGRAM(s) Oral at bedtime  clopidogrel Tablet 75 milliGRAM(s) Oral daily  lisinopril 20 milliGRAM(s) Oral daily  metoprolol succinate  milliGRAM(s) Oral daily      Allergies    No Known Allergies    Vital Signs Last 24 Hrs  T(C): 36.6 (21 Jan 2022 14:10), Max: 36.7 (21 Jan 2022 11:53)  T(F): 97.8 (21 Jan 2022 14:10), Max: 98 (21 Jan 2022 11:53)  HR: 83 (21 Jan 2022 18:40) (63 - 93)  BP: 110/70 (21 Jan 2022 18:40) (100/66 - 166/96)  BP(mean): 78 (21 Jan 2022 18:40) (74 - 124)  RR: 16 (21 Jan 2022 18:40) (14 - 17)  SpO2: 95% (21 Jan 2022 18:40) (93% - 98%)    I&O's Summary    21 Jan 2022 07:01  -  22 Jan 2022 02:26  --------------------------------------------------------  IN: 0 mL / OUT: 400 mL / NET: -400 mL      Weight (kg): 64 (01-21 @ 11:53)    PHYSICAL EXAM:  TELE:   Constitutional: NAD, awake and alert, well-developed  HEENT: Moist Mucous Membranes, Anicteric  Pulmonary: Non-labored, breath sounds are clear bilaterally, No wheezing, rales or rhonchi  Cardiovascular: Regular, S1 and S2, No murmurs, rubs, gallops or clicks  Gastrointestinal: Bowel Sounds present, soft, nontender.   Lymph: No peripheral edema. No lymphadenopathy.  Skin: No visible rashes or ulcers.  Psych:  Mood & affect appropriate    LABS: All Labs Reviewed:                        14.2   9.24  )-----------( 287      ( 21 Jan 2022 12:32 )             41.7     21 Jan 2022 12:32    138    |  102    |  15     ----------------------------<  101    4.3     |  24     |  0.88     Ca    9.4        21 Jan 2022 12:32  Mg     2.2       21 Jan 2022 12:32    A/P: s/p Cardiac Cath  Cardiac Cath: High LVEDP. Patent LAD stent. Moderate OM2 disease - normal IFR.  Severe ostial D1 disease - s/p POBA ; 90%  Right radial access   Recommendations:     Diuresis. Med titration.   DAPT for at least 3 mo.    f/u with Cardiology  clinic.

## 2022-01-21 NOTE — H&P CARDIOLOGY - HISTORY OF PRESENT ILLNESS
61 years old  male with PMHx of HTN, HLD, anterior wall STEMI on 7/6/2017 with PEARL to 100% occlusion lesion in the proximal LAD with subsequent mild to moderate LV systolic dysfunction and EF of 40-45%.  Pt had a mid Cx 50% stenosis which was not intervened on.  Patient has been non complaint with his medications and was lost to f/u.  His recent presentation to ED with CP on 11/15 revealed a drop in his EF to 30-35%, but troponin was negative x1 and he ultimately AMA'd without further evaluation.  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  Pt was evaluated on 1/13 at cardiology clinic and referred for [facility] for further ischemic evaluation.  No implantable cardiac monitoring device. Reports mild chest discomfort with exertion.

## 2022-01-21 NOTE — DISCHARGE NOTE PROVIDER - HOSPITAL COURSE
61 years old  male with PMHx of HTN, HLD, anterior wall STEMI on 7/6/2017 with PEARL to 100% occlusion lesion in the proximal LAD with subsequent mild to moderate LV systolic dysfunction and EF of 40-45%.  Pt had a mid Cx 50% stenosis which was not intervened on.  Patient has been non complaint with his medications and was lost to f/u.  His recent presentation to ED with CP on 11/15 revealed a drop in his EF to 30-35%, but troponin was negative x1 and he ultimately AMA'd without further evaluation.  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  Pt was evaluated on 1/13 at cardiology clinic and referred for [facility] for further ischemic evaluation.  No implantable cardiac monitoring device. Reports mild chest discomfort with exertion.  Patient referred for Cardiac catherization with possible intervention     s/p Cardiac cath:   The findings of this study were as follows:    High LVEDP. Patent LAD stent. Moderate OM2 disease - normal IFR.  Severe ostial D1 disease - s/p POBA ; 90%  Right radial access   Recommendations:     Diuresis. Med titration. DAPT for at least 3 mo.  f/u with Cardiology  clinic.       61 years old  male with PMHx of HTN, HLD, anterior wall STEMI on 7/6/2017 with PEARL to 100% occlusion lesion in the proximal LAD with subsequent mild to moderate LV systolic dysfunction and EF of 40-45%.  Pt had a mid Cx 50% stenosis which was not intervened on.  Patient has been non complaint with his medications and was lost to f/u.  His recent presentation to ED with CP on 11/15 revealed a drop in his EF to 30-35%, but troponin was negative x1 and he ultimately AMA'd without further evaluation.  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  Pt was evaluated on 1/13 at cardiology clinic and referred for [facility] for further ischemic evaluation.  No implantable cardiac monitoring device. Reports mild chest discomfort with exertion.  Patient referred for Cardiac catherization with possible intervention     s/p Cardiac cath:   The findings of this study were as follows:    High LVEDP. Patent LAD stent. Moderate OM2 disease - normal IFR.  Severe ostial D1 disease - s/p POBA ; 90%  Right radial access   Recommendations:     Diuresis. Med titration. DAPT for at least 3 mo.  f/u with Cardiology  clinic.    1/22 s/p C with POBA to ostial Diag via RRA     Right wrist stable w/ bleeding or hematoma; site soft, non tender.  Right radial pulse palpable +2.  Denies chest pain, denies right wrist/arm/hand:  pain, numbness, or tingling   no events on tele over night       - Reviewed and reinforced with patient:  wound care instructions, activities dos and dont's, medication compliance specifically antiplatelet therapy given stent/s.    - Patient aware to take DAPT  as prescribed and DO NOT STOP taking without consulting cardiologist first or STENT/s WILL CLOSE  - Reviewed and reinforced with patient:  site complications ( eg: bleeding, excruciating pain at the procedural site, large swelling ball size-  extremity numbness, tingling, temperature change), or CHEST PAIN; pt aware that if any of those occur he/she must call cardiologist IMMEDIATELY or 911 or go to nearest emergency room   - Reviewed and reinforced a heart healthy diet, Smoking Cessation  - Patient verbalizes understanding of ALL OF THE ABOVE, and gives positive feedback   ok to dc home at this time  f/u with outpt cards in 1-2 weeks

## 2022-01-21 NOTE — H&P CARDIOLOGY - NSICDXPASTMEDICALHX_GEN_ALL_CORE_FT
PAST MEDICAL HISTORY:  History of acute anterior wall MI s/p prox LAD DESx1 on 7/6/17    HLD (hyperlipidemia)     HTN (hypertension)

## 2022-01-21 NOTE — DISCHARGE NOTE PROVIDER - NSDCHC_MEDRECSTATUS_GEN_ALL_CORE
Pt's wife informed.  Rx placed at [facility] .  Referral placed.  Audi Cardozo, [facility]     Admission Reconciliation is Completed  Discharge Reconciliation is Not Complete Admission Reconciliation is Completed  Discharge Reconciliation is Completed

## 2022-01-21 NOTE — DISCHARGE NOTE PROVIDER - NSDCMRMEDTOKEN_GEN_ALL_CORE_FT
aspirin 81 mg oral tablet, chewable: 1 tab(s) orally once a day  atorvastatin 80 mg oral tablet: 1 tab(s) orally once a day (at bedtime)  lisinopril 20 mg oral tablet: 1 tab(s) orally once a day  Metoprolol Succinate  mg oral tablet, extended release: 1 tab(s) orally once a day   aspirin 81 mg oral tablet, chewable: 1 tab(s) orally once a day  atorvastatin 80 mg oral tablet: 1 tab(s) orally once a day (at bedtime)  clopidogrel 75 mg oral tablet: 1 tab(s) orally once a day  lisinopril 20 mg oral tablet: 1 tab(s) orally once a day  Metoprolol Succinate  mg oral tablet, extended release: 1 tab(s) orally once a day   aspirin 81 mg oral tablet, chewable: 1 tab(s) orally once a day  atorvastatin 80 mg oral tablet: 1 tab(s) orally once a day (at bedtime)  clopidogrel 75 mg oral tablet: 1 tab(s) orally once a day MDD:1  lisinopril 20 mg oral tablet: 1 tab(s) orally once a day  Metoprolol Succinate  mg oral tablet, extended release: 1 tab(s) orally once a day

## 2022-01-22 ENCOUNTER — TRANSCRIPTION ENCOUNTER (OUTPATIENT)
Age: 62
End: 2022-01-22

## 2022-01-22 VITALS
SYSTOLIC BLOOD PRESSURE: 152 MMHG | DIASTOLIC BLOOD PRESSURE: 99 MMHG | HEART RATE: 66 BPM | OXYGEN SATURATION: 98 % | RESPIRATION RATE: 16 BRPM

## 2022-01-22 PROCEDURE — 85027 COMPLETE CBC AUTOMATED: CPT

## 2022-01-22 PROCEDURE — 93571 IV DOP VEL&/PRESS C FLO 1ST: CPT | Mod: LC

## 2022-01-22 PROCEDURE — C1894: CPT

## 2022-01-22 PROCEDURE — 99152 MOD SED SAME PHYS/QHP 5/>YRS: CPT

## 2022-01-22 PROCEDURE — 99153 MOD SED SAME PHYS/QHP EA: CPT

## 2022-01-22 PROCEDURE — 92920 PRQ TRLUML C ANGIOP 1ART&/BR: CPT | Mod: LD

## 2022-01-22 PROCEDURE — 93458 L HRT ARTERY/VENTRICLE ANGIO: CPT | Mod: 59

## 2022-01-22 PROCEDURE — C1725: CPT

## 2022-01-22 PROCEDURE — C1887: CPT

## 2022-01-22 PROCEDURE — 93005 ELECTROCARDIOGRAM TRACING: CPT

## 2022-01-22 PROCEDURE — 83735 ASSAY OF MAGNESIUM: CPT

## 2022-01-22 PROCEDURE — 80048 BASIC METABOLIC PNL TOTAL CA: CPT

## 2022-01-22 PROCEDURE — C1769: CPT

## 2022-01-22 RX ORDER — CLOPIDOGREL BISULFATE 75 MG/1
1 TABLET, FILM COATED ORAL
Qty: 90 | Refills: 3
Start: 2022-01-22 | End: 2023-01-16

## 2022-01-22 RX ORDER — CLOPIDOGREL BISULFATE 75 MG/1
1 TABLET, FILM COATED ORAL
Qty: 30 | Refills: 3
Start: 2022-01-22 | End: 2022-05-21

## 2022-01-22 RX ADMIN — LISINOPRIL 20 MILLIGRAM(S): 2.5 TABLET ORAL at 05:34

## 2022-01-22 RX ADMIN — CLOPIDOGREL BISULFATE 75 MILLIGRAM(S): 75 TABLET, FILM COATED ORAL at 05:35

## 2022-01-22 RX ADMIN — Medication 100 MILLIGRAM(S): at 05:34

## 2022-01-22 RX ADMIN — Medication 81 MILLIGRAM(S): at 05:34

## 2022-01-22 NOTE — DISCHARGE NOTE NURSING/CASE MANAGEMENT/SOCIAL WORK - NSDCPEFALRISK_GEN_ALL_CORE
For information on Fall & Injury Prevention, visit: [email or web address] OR  [email or web address] OR  https://www.cdc.gov/steadi/patient.html

## 2022-01-22 NOTE — DISCHARGE NOTE NURSING/CASE MANAGEMENT/SOCIAL WORK - PATIENT PORTAL LINK FT
You can access the FollowMyHealth Patient Portal offered by [facility] by registering at the following website: [email or web address]. By joining [facility]’s FollowMyHealth portal, you will also be able to view your health information using other applications (apps) compatible with our system.

## 2022-02-14 ENCOUNTER — NON-APPOINTMENT (OUTPATIENT)
Age: 62
End: 2022-02-14

## 2023-08-09 PROBLEM — I25.2 OLD MYOCARDIAL INFARCTION: Chronic | Status: ACTIVE | Noted: 2022-01-21

## 2023-08-16 ENCOUNTER — APPOINTMENT (OUTPATIENT)
Dept: CARDIOLOGY | Facility: HOSPITAL | Age: 63
End: 2023-08-16

## 2023-08-16 ENCOUNTER — OUTPATIENT (OUTPATIENT)
Dept: OUTPATIENT SERVICES | Facility: HOSPITAL | Age: 63
LOS: 1 days | End: 2023-08-16
Payer: SELF-PAY

## 2023-08-16 VITALS — OXYGEN SATURATION: 98 % | DIASTOLIC BLOOD PRESSURE: 83 MMHG | SYSTOLIC BLOOD PRESSURE: 152 MMHG | HEART RATE: 58 BPM

## 2023-08-16 DIAGNOSIS — Z98.890 OTHER SPECIFIED POSTPROCEDURAL STATES: Chronic | ICD-10-CM

## 2023-08-16 DIAGNOSIS — I25.10 ATHEROSCLEROTIC HEART DISEASE OF NATIVE CORONARY ARTERY WITHOUT ANGINA PECTORIS: ICD-10-CM

## 2023-08-16 DIAGNOSIS — E78.5 HYPERLIPIDEMIA, UNSPECIFIED: ICD-10-CM

## 2023-08-16 PROCEDURE — G0463: CPT

## 2023-08-16 PROCEDURE — 93005 ELECTROCARDIOGRAM TRACING: CPT

## 2023-08-16 NOTE — ED ADULT TRIAGE NOTE - CCCP TRG CHIEF CMPLNT
Chief Complaint   Patient presents with    Knee Pain     Right knee pain  withsports   Was seen ortho      1. Have you been to the ER, urgent care clinic since your last visit? Hospitalized since your last visit? No    2. Have you seen or consulted any other health care providers outside of the [address] [place] [address] since your last visit? Include any pap smears or colon screening.  No chest pain

## 2023-08-18 PROBLEM — E78.5 DYSLIPIDEMIA: Status: ACTIVE | Noted: 2023-08-18

## 2023-08-18 RX ORDER — ASPIRIN 81 MG/1
81 TABLET, COATED ORAL DAILY
Qty: 90 | Refills: 1 | Status: DISCONTINUED | COMMUNITY
Start: 2021-11-18 | End: 2023-08-18

## 2023-08-18 RX ORDER — CLOPIDOGREL 75 MG/1
75 TABLET, FILM COATED ORAL
Refills: 0 | Status: ACTIVE | COMMUNITY

## 2023-08-22 LAB
ANION GAP SERPL CALC-SCNC: 12 MMOL/L
BUN SERPL-MCNC: 15 MG/DL
CALCIUM SERPL-MCNC: 9.4 MG/DL
CHLORIDE SERPL-SCNC: 105 MMOL/L
CO2 SERPL-SCNC: 24 MMOL/L
CREAT SERPL-MCNC: 0.83 MG/DL
EGFR: 97 ML/MIN/1.73M2
GLUCOSE SERPL-MCNC: 80 MG/DL
POTASSIUM SERPL-SCNC: 4.1 MMOL/L
SODIUM SERPL-SCNC: 141 MMOL/L

## 2023-08-22 RX ORDER — SPIRONOLACTONE 25 MG/1
25 TABLET ORAL DAILY
Qty: 90 | Refills: 0 | Status: ACTIVE | COMMUNITY
Start: 2023-08-22 | End: 1900-01-01

## 2023-08-22 NOTE — HISTORY OF PRESENT ILLNESS
[FreeTextEntry1] : 61M with PMH of HTN, HLD, anterior wall STEMI on 7/6/2017 w/ a drug-eluting stent inserted on a 100% lesion in the proximal LAD w/ subsequent mild to moderate LV systolic dysfunction and EF of 40-45%. Pt also had a mid circumflex 50 % stenosis which was not intervened on at that time. He was lost to follow-up with medication non-adherence and later presented to the ED with CP on 11/2022 with evaluation notable for a drop in his EF to 30-35%. 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 Patent LAD stent. Moderate OM2 disease - normal IFR. Severe ostial D1 disease - s/p POBA w. recs for at least 3 months of DAPT). Patient last seen 1/2022.   Now presenting for follow-up.  Endorsing a stable mild chest pain that is constant at rest, but worsens with significant exertion (after [place] subway rapidly or if walking fast) but does not occur with normal exertion and has an ET of about 30 blocks. No SOB/COELLO. CP has been present and stable for about 1 year (symptoms improved briefly following POBA).   Seen at [facility] cardiology clinic 5/2023. Exercise stress was performed and stopped early due to fatigue.  There was a medium to large sized, non-reversible, myocardial perfusion defect of the apical anteroseptal wall with an EF of 28%. He was ordered for Jardiance 10mg, entresto 24-26, ASA was switched to Plavix, atorvastatin and Toprol Xl 50 mg but was unable to obtain most meds due to cost. The cardiologist at [facility] discussed ICD placement which the patient is asking about this today.  Current meds: toprol 25 mg, lisinopril 20 mg, lipitor 80 mg, and Plavix 75 mg.   He is hypertensive today to 152/83, HR 58. He takes BP at home, uwsually 160s systolic.    [id number]

## 2023-08-22 NOTE — CARDIOLOGY SUMMARY
[de-identified] : 11/15/2021 1. Tethered mitral valve leaflets with normal opening. 2. Thickened trileaflet aortic valve. Mild aortic regurgitation. 3. Endocardial visualization enhanced with intravenous injection of Ultrasonic Enhancing Agent (Definity). Severe segmental left ventricular systolic dysfunction. No left ventricular thrombus. 4. Reversal of the E-A  waves of the mitral inflow pattern is consistent with diastolic LV dysfunction. 5. Normal right ventricular size and function. *** Compared with echocardiogram of 5/4/2018, LV function has deteriorated. EF 32% [de-identified] : 1/21/22 LM Left main artery: There is a 25 % stenosis.    LAD Proximal left anterior descending: There is a 20 % stenosis within the stented segment. Mid left anterior descending: There is a 40 % stenosis. First diagonal: There is a 70 % stenosis in the ostium portion of the segment. Instant wave-free ratio was performed with a calculated value of 0.88.    CX First obtuse marginal: There is a 25 % stenosis. Second obtuse marginal: There is a 60 % stenosis. Instant wave-free ratio was performed with a calculated value of 0.98.    RCA Mid right coronary artery: There is a 25 % stenosis.

## 2023-08-22 NOTE — ASSESSMENT
[FreeTextEntry1] : 61M with PMH of HTN, HLD, ischemic cardiomyopathy/HFrEF (EF ~30%), CAD (prior anterior wall STEMI on 7/6/2017 w/ PEARL tp 100% lesion in the pLAD) presenting for follow-up. He has symptoms c/w stable angina and is well compensated from a HF standpoint. While his EF is <35%, he has not been on optimal GDMT due in part to limited follow-up and financial limitations in obtaining some medications. Patient is hypertensive and so has significant room for up-titration of GDMT. As he has not been seen in over 1 year, repeat BMP is needed prior to adding/increasing dose of ACE-i/MRA.  Plan #HFrEF #Ischemic Cardiomyopathy  - BMP today - Cr and K+ stable/WNL (patient was called and informed of results on 8/22)   - Increase lisinopril from 20 mg to 40 mg   - Start spironolactone 25 mg daily   - Continue Toprol XL 25 mg - no room for increase at this time due to bradycardia   - Repeat BMP in 2 weeks after initiation of meds   - Due to patient's uninsured status, he is unable to obtain/afford Entresto or SGLT2i - Start Imdur 30 mg for anti-anginal - patient advised to not take ED meds (he states he takes sildenafil occasionally)  - To RTC in 3 months for repeat TTE while on adequate GDMT, if EF <35% at that time will discuss referral for ICD  #CAD #HLD - Continue Plavix - Continue Atorvastatin 80 mg   #HTN - Continue above meds

## 2023-08-23 DIAGNOSIS — I25.5 ISCHEMIC CARDIOMYOPATHY: ICD-10-CM

## 2023-08-23 DIAGNOSIS — E78.5 HYPERLIPIDEMIA, UNSPECIFIED: ICD-10-CM

## 2023-08-23 DIAGNOSIS — I10 ESSENTIAL (PRIMARY) HYPERTENSION: ICD-10-CM

## 2023-08-30 ENCOUNTER — NON-APPOINTMENT (OUTPATIENT)
Age: 63
End: 2023-08-30

## 2023-09-06 ENCOUNTER — APPOINTMENT (OUTPATIENT)
Dept: CARDIOLOGY | Facility: HOSPITAL | Age: 63
End: 2023-09-06

## 2023-09-06 ENCOUNTER — OUTPATIENT (OUTPATIENT)
Dept: OUTPATIENT SERVICES | Facility: HOSPITAL | Age: 63
LOS: 1 days | End: 2023-09-06
Payer: SELF-PAY

## 2023-09-06 VITALS
OXYGEN SATURATION: 98 % | BODY MASS INDEX: 22.5 KG/M2 | SYSTOLIC BLOOD PRESSURE: 152 MMHG | HEART RATE: 51 BPM | WEIGHT: 140 LBS | DIASTOLIC BLOOD PRESSURE: 77 MMHG | HEIGHT: 66 IN

## 2023-09-06 DIAGNOSIS — I25.10 ATHEROSCLEROTIC HEART DISEASE OF NATIVE CORONARY ARTERY WITHOUT ANGINA PECTORIS: ICD-10-CM

## 2023-09-06 DIAGNOSIS — Z98.890 OTHER SPECIFIED POSTPROCEDURAL STATES: Chronic | ICD-10-CM

## 2023-09-06 PROCEDURE — 93005 ELECTROCARDIOGRAM TRACING: CPT

## 2023-09-06 PROCEDURE — G0463: CPT

## 2023-09-06 RX ORDER — ISOSORBIDE MONONITRATE 30 MG/1
30 TABLET, EXTENDED RELEASE ORAL DAILY
Qty: 90 | Refills: 1 | Status: ACTIVE | COMMUNITY
Start: 2023-09-06 | End: 1900-01-01

## 2023-09-06 RX ORDER — LISINOPRIL 40 MG/1
40 TABLET ORAL DAILY
Qty: 90 | Refills: 0 | Status: DISCONTINUED | COMMUNITY
Start: 2021-11-18 | End: 2023-09-06

## 2023-09-06 NOTE — PHYSICAL EXAM
[Normal] : well developed, well nourished, no acute distress [Normal Venous Pressure] : normal venous pressure [Normal S1, S2] : normal S1, S2 [No Rub] : no rub [No Gallop] : no gallop [Clear Lung Fields] : clear lung fields [Good Air Entry] : good air entry [Soft] : abdomen soft [No Edema] : no edema

## 2023-09-06 NOTE — HISTORY OF PRESENT ILLNESS
[FreeTextEntry1] : Presenting as a walk in to discuss concerns over new medication that was prescribed at last visit.  Took aldactone for 2 days, had headache, vomiting, chills, diarrhea, and urinary frequency and d/c with resolution. He was unable to obtian the higher dose of lisinopril and the imdur do to cost at [facility].  No other new complaints since last visit <1 month ago.

## 2023-09-06 NOTE — CARDIOLOGY SUMMARY
[de-identified] : 11/15/2021 1. Tethered mitral valve leaflets with normal opening. 2. Thickened trileaflet aortic valve. Mild aortic regurgitation. 3. Endocardial visualization enhanced with intravenous injection of Ultrasonic Enhancing Agent (Definity). Severe segmental left ventricular systolic dysfunction. No left ventricular thrombus. 4. Reversal of the E-A  waves of the mitral inflow pattern is consistent with diastolic LV dysfunction. 5. Normal right ventricular size and function. *** Compared with echocardiogram of 5/4/2018, LV function has deteriorated. EF 32% [de-identified] : 1/21/22 LM Left main artery: There is a 25 % stenosis.    LAD Proximal left anterior descending: There is a 20 % stenosis within the stented segment. Mid left anterior descending: There is a 40 % stenosis. First diagonal: There is a 70 % stenosis in the ostium portion of the segment. Instant wave-free ratio was performed with a calculated value of 0.88.    CX First obtuse marginal: There is a 25 % stenosis. Second obtuse marginal: There is a 60 % stenosis. Instant wave-free ratio was performed with a calculated value of 0.98.    RCA Mid right coronary artery: There is a 25 % stenosis.

## 2023-09-07 DIAGNOSIS — I10 ESSENTIAL (PRIMARY) HYPERTENSION: ICD-10-CM

## 2023-09-07 DIAGNOSIS — I25.9 CHRONIC ISCHEMIC HEART DISEASE, UNSPECIFIED: ICD-10-CM

## 2023-11-15 ENCOUNTER — RX RENEWAL (OUTPATIENT)
Age: 63
End: 2023-11-15

## 2023-11-22 ENCOUNTER — OUTPATIENT (OUTPATIENT)
Dept: OUTPATIENT SERVICES | Facility: HOSPITAL | Age: 63
LOS: 1 days | End: 2023-11-22
Payer: SELF-PAY

## 2023-11-22 ENCOUNTER — APPOINTMENT (OUTPATIENT)
Dept: CARDIOLOGY | Facility: HOSPITAL | Age: 63
End: 2023-11-22

## 2023-11-22 VITALS
OXYGEN SATURATION: 98 % | WEIGHT: 140 LBS | BODY MASS INDEX: 22.6 KG/M2 | SYSTOLIC BLOOD PRESSURE: 143 MMHG | HEART RATE: 52 BPM | DIASTOLIC BLOOD PRESSURE: 76 MMHG

## 2023-11-22 DIAGNOSIS — Z98.890 OTHER SPECIFIED POSTPROCEDURAL STATES: Chronic | ICD-10-CM

## 2023-11-22 DIAGNOSIS — I25.10 ATHEROSCLEROTIC HEART DISEASE OF NATIVE CORONARY ARTERY WITHOUT ANGINA PECTORIS: ICD-10-CM

## 2023-11-22 DIAGNOSIS — I25.118 ATHEROSCLEROTIC HEART DISEASE OF NATIVE CORONARY ARTERY WITH OTHER FORMS OF ANGINA PECTORIS: ICD-10-CM

## 2023-11-22 PROCEDURE — G0463: CPT

## 2023-11-22 PROCEDURE — 93005 ELECTROCARDIOGRAM TRACING: CPT

## 2023-11-22 RX ORDER — METOPROLOL SUCCINATE 100 MG/1
100 TABLET, EXTENDED RELEASE ORAL DAILY
Qty: 90 | Refills: 3 | Status: ACTIVE | COMMUNITY
Start: 2021-11-18 | End: 1900-01-01

## 2023-11-22 RX ORDER — ATORVASTATIN CALCIUM 40 MG/1
40 TABLET, FILM COATED ORAL
Qty: 90 | Refills: 2 | Status: ACTIVE | COMMUNITY
Start: 2021-11-18 | End: 1900-01-01

## 2023-11-22 RX ORDER — LISINOPRIL 40 MG/1
40 TABLET ORAL DAILY
Qty: 90 | Refills: 1 | Status: ACTIVE | COMMUNITY
Start: 2023-09-06 | End: 1900-01-01

## 2023-11-22 RX ORDER — ISOSORBIDE MONONITRATE 30 MG/1
30 TABLET, EXTENDED RELEASE ORAL DAILY
Qty: 30 | Refills: 2 | Status: ACTIVE | COMMUNITY
Start: 2023-08-16 | End: 1900-01-01

## 2023-11-28 DIAGNOSIS — I25.118 ATHEROSCLEROTIC HEART DISEASE OF NATIVE CORONARY ARTERY WITH OTHER FORMS OF ANGINA PECTORIS: ICD-10-CM

## 2023-11-28 DIAGNOSIS — I25.5 ISCHEMIC CARDIOMYOPATHY: ICD-10-CM

## 2023-11-28 DIAGNOSIS — I10 ESSENTIAL (PRIMARY) HYPERTENSION: ICD-10-CM

## 2024-02-08 ENCOUNTER — OUTPATIENT (OUTPATIENT)
Dept: OUTPATIENT SERVICES | Facility: HOSPITAL | Age: 64
LOS: 1 days | End: 2024-02-08

## 2024-02-08 ENCOUNTER — APPOINTMENT (OUTPATIENT)
Dept: CV DIAGNOSITCS | Facility: HOSPITAL | Age: 64
End: 2024-02-08

## 2024-02-08 DIAGNOSIS — Z98.890 OTHER SPECIFIED POSTPROCEDURAL STATES: Chronic | ICD-10-CM

## 2024-02-08 DIAGNOSIS — I10 ESSENTIAL (PRIMARY) HYPERTENSION: ICD-10-CM

## 2024-02-08 DIAGNOSIS — I25.5 ISCHEMIC CARDIOMYOPATHY: ICD-10-CM

## 2024-02-08 DIAGNOSIS — I25.118 ATHEROSCLEROTIC HEART DISEASE OF NATIVE CORONARY ARTERY WITH OTHER FORMS OF ANGINA PECTORIS: ICD-10-CM

## 2024-02-14 ENCOUNTER — APPOINTMENT (OUTPATIENT)
Dept: CARDIOLOGY | Facility: HOSPITAL | Age: 64
End: 2024-02-14

## 2024-02-14 ENCOUNTER — OUTPATIENT (OUTPATIENT)
Dept: OUTPATIENT SERVICES | Facility: HOSPITAL | Age: 64
LOS: 1 days | End: 2024-02-14
Payer: SELF-PAY

## 2024-02-14 VITALS — OXYGEN SATURATION: 96 % | HEART RATE: 77 BPM | DIASTOLIC BLOOD PRESSURE: 84 MMHG | SYSTOLIC BLOOD PRESSURE: 146 MMHG

## 2024-02-14 DIAGNOSIS — Z98.890 OTHER SPECIFIED POSTPROCEDURAL STATES: Chronic | ICD-10-CM

## 2024-02-14 DIAGNOSIS — I25.10 ATHEROSCLEROTIC HEART DISEASE OF NATIVE CORONARY ARTERY WITHOUT ANGINA PECTORIS: ICD-10-CM

## 2024-02-14 DIAGNOSIS — I25.10 ATHEROSCLEROTIC HEART DISEASE OF NATIVE CORONARY ARTERY W/OUT ANGINA PECTORIS: ICD-10-CM

## 2024-02-14 DIAGNOSIS — I25.9 CHRONIC ISCHEMIC HEART DISEASE, UNSPECIFIED: ICD-10-CM

## 2024-02-14 DIAGNOSIS — I25.5 ISCHEMIC CARDIOMYOPATHY: ICD-10-CM

## 2024-02-14 DIAGNOSIS — I10 ESSENTIAL (PRIMARY) HYPERTENSION: ICD-10-CM

## 2024-02-14 PROCEDURE — G0463: CPT

## 2024-02-14 PROCEDURE — 93005 ELECTROCARDIOGRAM TRACING: CPT

## 2024-02-14 RX ORDER — KRILL/OM-3/DHA/EPA/PHOSPHO/AST 1000-230MG
81 CAPSULE ORAL DAILY
Qty: 90 | Refills: 1 | Status: ACTIVE | COMMUNITY
Start: 2024-02-14 | End: 1900-01-01

## 2024-02-15 DIAGNOSIS — I10 ESSENTIAL (PRIMARY) HYPERTENSION: ICD-10-CM

## 2024-02-15 DIAGNOSIS — I25.5 ISCHEMIC CARDIOMYOPATHY: ICD-10-CM

## 2024-02-15 DIAGNOSIS — I25.9 CHRONIC ISCHEMIC HEART DISEASE, UNSPECIFIED: ICD-10-CM

## 2024-02-15 PROBLEM — I25.10 CAD (CORONARY ARTERY DISEASE): Status: ACTIVE | Noted: 2023-08-16

## 2024-02-15 NOTE — CARDIOLOGY SUMMARY
[de-identified] : 11/15/2021 1. Tethered mitral valve leaflets with normal opening. 2. Thickened trileaflet aortic valve. Mild aortic regurgitation. 3. Endocardial visualization enhanced with intravenous injection of Ultrasonic Enhancing Agent (Definity). Severe segmental left ventricular systolic dysfunction. No left ventricular thrombus. 4. Reversal of the E-A  waves of the mitral inflow pattern is consistent with diastolic LV dysfunction. 5. Normal right ventricular size and function. *** Compared with echocardiogram of 5/4/2018, LV function has deteriorated. EF 32% [de-identified] : 1/21/22 LM Left main artery: There is a 25 % stenosis.    LAD Proximal left anterior descending: There is a 20 % stenosis within the stented segment. Mid left anterior descending: There is a 40 % stenosis. First diagonal: There is a 70 % stenosis in the ostium portion of the segment. Instant wave-free ratio was performed with a calculated value of 0.88.    CX First obtuse marginal: There is a 25 % stenosis. Second obtuse marginal: There is a 60 % stenosis. Instant wave-free ratio was performed with a calculated value of 0.98.    RCA Mid right coronary artery: There is a 25 % stenosis.

## 2024-02-15 NOTE — HISTORY OF PRESENT ILLNESS
[FreeTextEntry1] : Presenting today for follow-up. 61M with PMH of HTN, HLD, ischemic cardiomyopathy/HFrEF (EF ~30%) s/p [employer] ICD (single chamber), CAD (prior anterior wall STEMI on 7/6/2017 w/ PEARL to 100% lesion in the pLAD) presenting for follow-up.   Continues to take all medications daily aside from statin, states he is still having leg cramping with lower dose of atorvastatin 40 mg. States he had an ICD placed for primary prevention 12/2023 at [facility] and that he follows now with a cardiologist in that group.   Denies OUMOU FRAZIER, lower extremity edema.

## 2024-02-15 NOTE — ASSESSMENT
[FreeTextEntry1] : 61M with PMH of HTN, HLD, ischemic cardiomyopathy/HFrEF (EF ~30%), CAD (prior anterior wall STEMI on 7/6/2017 w/ PEARL tp 100% lesion in the pLAD) presenting for follow-up. He has symptoms c/w stable angina and non cardiac chest pain, and is well compensated from a HF standpoint. Optimal GDMT has been limited by uninsured status, bradycardia, and side-effects from MRA.  Patient reports that he is also following with a cardiologist in [place] in the same practice as his PCP and EP (Dr. Aung Oconnor). Discussed in detail with pt that it is important that he only see one cardiologist, and that we are happy to continue caring for him if he prefers, but if he prefers to see his cardiologist in [place], it would be best to stay at that practice to avoid 2 cardiologist from managing the same conditions.   Plan #HFrEF #Ischemic Cardiomyopathy s/p ICD  - Continue lisinopril 40 mg  - D/c spironolactone due to patient intolerance  - Continue Toprol XL 25 mg - no room for increase at this time due to bradycardia  - Due to patient's uninsured status, he is unable to obtain/afford Entresto or SGLT2i - working on forms with [place] for free/discounted entresto - Continue Imdur 30 mg for anti-anginal - patient advised to not take ED meds (he states he takes sildenafil occasionally) - Repeat TTE   #CAD #HLD - Continue ASA - Paitent reports intolerance to lipitor 80, will decrease back to 40 mg - discussed importance of statin medication - will defer to his primary cardiologist for management   #HTN - Continue above meds.